# Patient Record
Sex: FEMALE | HISPANIC OR LATINO | URBAN - METROPOLITAN AREA
[De-identification: names, ages, dates, MRNs, and addresses within clinical notes are randomized per-mention and may not be internally consistent; named-entity substitution may affect disease eponyms.]

---

## 2017-11-02 ENCOUNTER — DOCTOR'S OFFICE (OUTPATIENT)
Dept: URBAN - METROPOLITAN AREA CLINIC 125 | Facility: CLINIC | Age: 32
Setting detail: OPHTHALMOLOGY
End: 2017-11-02
Payer: COMMERCIAL

## 2017-11-02 DIAGNOSIS — H40.003: ICD-10-CM

## 2017-11-02 DIAGNOSIS — E11.3293: ICD-10-CM

## 2017-11-02 PROCEDURE — 92133 CPTRZD OPH DX IMG PST SGM ON: CPT | Performed by: OPTOMETRIST

## 2017-11-02 PROCEDURE — 92014 COMPRE OPH EXAM EST PT 1/>: CPT | Performed by: OPTOMETRIST

## 2017-11-02 ASSESSMENT — REFRACTION_MANIFEST
OS_VA2: 20/
OD_VA3: 20/
OD_VA3: 20/
OD_VA1: 20/
OS_VA1: 20/
OS_VA1: 20/
OD_VA2: 20/
OD_VA2: 20/
OS_VA2: 20/
OD_VA1: 20/
OU_VA: 20/
OS_VA3: 20/
OS_VA3: 20/
OU_VA: 20/

## 2017-11-02 ASSESSMENT — REFRACTION_OUTSIDERX
OD_CYLINDER: -2.00
OS_AXIS: 180
OD_VA1: 20/25
OU_VA: 20/
OD_VA3: 20/
OD_VA2: 20/25
OD_AXIS: 009
OS_SPHERE: -2.50
OD_SPHERE: -2.25
OS_VA2: 20/25
OS_VA3: 20/
OS_CYLINDER: -2.00
OS_VA1: 20/25

## 2017-11-02 ASSESSMENT — REFRACTION_CURRENTRX
OD_SPHERE: -2.25
OD_OVR_VA: 20/
OS_SPHERE: -2.75
OS_OVR_VA: 20/
OS_AXIS: 177
OD_OVR_VA: 20/
OD_OVR_VA: 20/
OS_OVR_VA: 20/
OD_VPRISM_DIRECTION: SV
OD_AXIS: 27
OS_CYLINDER: -2.00
OD_CYLINDER: -0.75
OS_OVR_VA: 20/
OS_VPRISM_DIRECTION: SV

## 2017-11-02 ASSESSMENT — REFRACTION_AUTOREFRACTION
OD_CYLINDER: -1.25
OD_AXIS: 008
OS_SPHERE: -2.50
OS_AXIS: 165
OD_SPHERE: -2.50
OS_CYLINDER: -1.25

## 2017-11-02 ASSESSMENT — KERATOMETRY
OS_K1POWER_DIOPTERS: 43.00
OD_AXISANGLE_DEGREES: 009
OS_K2POWER_DIOPTERS: 44.50
OD_K1POWER_DIOPTERS: 43.00
OD_K2POWER_DIOPTERS: 45.00
OS_AXISANGLE_DEGREES: 175

## 2017-11-02 ASSESSMENT — CONFRONTATIONAL VISUAL FIELD TEST (CVF)
OS_FINDINGS: FULL
OD_FINDINGS: FULL

## 2017-11-02 ASSESSMENT — SPHEQUIV_DERIVED
OD_SPHEQUIV: -3.125
OS_SPHEQUIV: -3.125

## 2017-11-02 ASSESSMENT — VASCULARIZATION
OS_VASCULARIZATION: PANNUS
OD_VASCULARIZATION: PANNUS

## 2017-11-02 ASSESSMENT — AXIALLENGTH_DERIVED
OD_AL: 24.6731
OS_AL: 24.774

## 2017-11-02 ASSESSMENT — VISUAL ACUITY
OS_BCVA: 20/25
OD_BCVA: 20/20-2

## 2017-11-09 ENCOUNTER — DOCTOR'S OFFICE (OUTPATIENT)
Dept: URBAN - METROPOLITAN AREA CLINIC 125 | Facility: CLINIC | Age: 32
Setting detail: OPHTHALMOLOGY
End: 2017-11-09
Payer: COMMERCIAL

## 2017-11-09 DIAGNOSIS — H52.223: ICD-10-CM

## 2017-11-09 DIAGNOSIS — H52.13: ICD-10-CM

## 2017-11-09 PROCEDURE — 92310 CONTACT LENS FITTING OU: CPT | Performed by: OPTOMETRIST

## 2017-11-09 PROCEDURE — 92015 DETERMINE REFRACTIVE STATE: CPT | Performed by: OPTOMETRIST

## 2017-11-09 ASSESSMENT — REFRACTION_MANIFEST
OD_VA3: 20/
OS_VA1: 20/
OD_VA2: 20/
OS_VA2: 20/
OS_VA2: 20/
OU_VA: 20/
OD_VA2: 20/
OD_VA1: 20/
OS_VA3: 20/
OS_VA3: 20/
OD_VA3: 20/
OS_VA1: 20/
OU_VA: 20/
OD_VA1: 20/

## 2017-11-09 ASSESSMENT — LID EXAM ASSESSMENTS
OS_MEIBOMITIS: LLL LUL T
OD_MEIBOMITIS: RLL RUL T

## 2017-11-09 ASSESSMENT — AXIALLENGTH_DERIVED
OD_AL: 24.6731
OS_AL: 24.774

## 2017-11-09 ASSESSMENT — VASCULARIZATION
OS_VASCULARIZATION: PANNUS
OD_VASCULARIZATION: PANNUS

## 2017-11-09 ASSESSMENT — REFRACTION_CURRENTRX
OS_OVR_VA: 20/
OD_CYLINDER: -2.00
OD_OVR_VA: 20/
OD_OVR_VA: 20/
OD_SPHERE: -2.25
OD_VPRISM_DIRECTION: SV
OS_CYLINDER: -1.50
OD_OVR_VA: 20/
OS_VPRISM_DIRECTION: SV
OD_AXIS: 008
OS_SPHERE: -2.75
OS_AXIS: 180
OS_OVR_VA: 20/
OS_OVR_VA: 20/

## 2017-11-09 ASSESSMENT — REFRACTION_OUTSIDERX
OD_VA3: 20/
OD_CYLINDER: -2.00
OU_VA: 20/
OD_SPHERE: -2.50
OS_AXIS: 180
OS_VA1: 20/20-1
OD_AXIS: 010
OD_VA2: 20/25
OS_SPHERE: -2.75
OS_CYLINDER: -2.00
OS_VA3: 20/
OD_VA1: 20/20
OS_VA2: 20/25

## 2017-11-09 ASSESSMENT — KERATOMETRY
OD_K2POWER_DIOPTERS: 45.00
OS_AXISANGLE_DEGREES: 175
OD_K1POWER_DIOPTERS: 43.00
OS_K1POWER_DIOPTERS: 43.00
OD_AXISANGLE_DEGREES: 009
OS_K2POWER_DIOPTERS: 44.50

## 2017-11-09 ASSESSMENT — REFRACTION_AUTOREFRACTION
OS_AXIS: 165
OS_CYLINDER: -1.25
OS_SPHERE: -2.50
OD_SPHERE: -2.50
OD_CYLINDER: -1.25
OD_AXIS: 008

## 2017-11-09 ASSESSMENT — VISUAL ACUITY
OS_BCVA: 20/20
OD_BCVA: 20/25+2

## 2017-11-09 ASSESSMENT — SPHEQUIV_DERIVED
OD_SPHEQUIV: -3.125
OS_SPHEQUIV: -3.125

## 2018-05-10 ENCOUNTER — DOCTOR'S OFFICE (OUTPATIENT)
Dept: URBAN - METROPOLITAN AREA CLINIC 125 | Facility: CLINIC | Age: 33
Setting detail: OPHTHALMOLOGY
End: 2018-05-10
Payer: COMMERCIAL

## 2018-05-10 DIAGNOSIS — H40.003: ICD-10-CM

## 2018-05-10 PROCEDURE — 92083 EXTENDED VISUAL FIELD XM: CPT | Performed by: OPTOMETRIST

## 2018-08-02 ENCOUNTER — DOCTOR'S OFFICE (OUTPATIENT)
Dept: URBAN - METROPOLITAN AREA CLINIC 125 | Facility: CLINIC | Age: 33
Setting detail: OPHTHALMOLOGY
End: 2018-08-02
Payer: COMMERCIAL

## 2018-08-02 DIAGNOSIS — E11.9: ICD-10-CM

## 2018-08-02 DIAGNOSIS — H40.013: ICD-10-CM

## 2018-08-02 PROCEDURE — 76514 ECHO EXAM OF EYE THICKNESS: CPT | Performed by: OPHTHALMOLOGY

## 2018-08-02 PROCEDURE — 92012 INTRM OPH EXAM EST PATIENT: CPT | Performed by: OPHTHALMOLOGY

## 2018-08-02 ASSESSMENT — REFRACTION_CURRENTRX
OS_VPRISM_DIRECTION: SV
OD_OVR_VA: 20/
OD_OVR_VA: 20/
OS_CYLINDER: -1.50
OD_OVR_VA: 20/
OS_OVR_VA: 20/
OS_SPHERE: -2.75
OS_OVR_VA: 20/
OD_VPRISM_DIRECTION: SV
OD_CYLINDER: -2.00
OS_AXIS: 180
OS_OVR_VA: 20/
OD_SPHERE: -2.25
OD_AXIS: 008

## 2018-08-02 ASSESSMENT — REFRACTION_AUTOREFRACTION
OS_AXIS: 165
OD_CYLINDER: -1.25
OD_SPHERE: -2.50
OS_SPHERE: -2.50
OD_AXIS: 008
OS_CYLINDER: -1.25

## 2018-08-02 ASSESSMENT — REFRACTION_OUTSIDERX
OD_VA1: 20/20
OD_CYLINDER: -2.00
OS_VA3: 20/
OD_AXIS: 010
OS_VA2: 20/25
OS_CYLINDER: -2.00
OU_VA: 20/
OD_VA2: 20/25
OD_SPHERE: -2.50
OD_VA3: 20/
OS_VA1: 20/20-1
OS_SPHERE: -2.75
OS_AXIS: 180

## 2018-08-02 ASSESSMENT — REFRACTION_MANIFEST
OS_VA1: 20/
OS_VA2: 20/
OD_VA3: 20/
OD_VA1: 20/
OS_VA2: 20/
OD_VA3: 20/
OS_VA1: 20/
OD_VA1: 20/
OS_VA3: 20/
OU_VA: 20/
OS_VA3: 20/
OD_VA2: 20/
OD_VA2: 20/
OU_VA: 20/

## 2018-08-02 ASSESSMENT — VASCULARIZATION
OS_VASCULARIZATION: PANNUS
OD_VASCULARIZATION: PANNUS

## 2018-08-02 ASSESSMENT — PACHYMETRY
OD_CT_UM: 591
OS_CT_UM: 597
OD_CT_CORRECTION: -4
OS_CT_CORRECTION: -4

## 2018-08-02 ASSESSMENT — VISUAL ACUITY
OD_BCVA: 20/25-2
OS_BCVA: 20/20

## 2018-08-02 ASSESSMENT — SPHEQUIV_DERIVED
OS_SPHEQUIV: -3.125
OD_SPHEQUIV: -3.125

## 2018-08-02 ASSESSMENT — CONFRONTATIONAL VISUAL FIELD TEST (CVF)
OD_FINDINGS: FULL
OS_FINDINGS: FULL

## 2019-01-17 ENCOUNTER — DOCTOR'S OFFICE (OUTPATIENT)
Dept: URBAN - METROPOLITAN AREA CLINIC 125 | Facility: CLINIC | Age: 34
Setting detail: OPHTHALMOLOGY
End: 2019-01-17
Payer: COMMERCIAL

## 2019-01-17 DIAGNOSIS — H25.013: ICD-10-CM

## 2019-01-17 DIAGNOSIS — E11.9: ICD-10-CM

## 2019-01-17 PROCEDURE — 92014 COMPRE OPH EXAM EST PT 1/>: CPT | Performed by: OPHTHALMOLOGY

## 2019-01-17 ASSESSMENT — REFRACTION_MANIFEST
OU_VA: 20/
OD_SPHERE: -2.50
OS_VA2: 20/
OD_AXIS: 010
OS_CYLINDER: -2.00
OD_VA1: 20/
OU_VA: 20/
OS_VA1: 20/20-1
OS_VA2: 20/25
OD_VA2: 20/25
OD_VA2: 20/
OS_VA3: 20/
OD_VA1: 20/20
OS_SPHERE: -2.75
OD_VA3: 20/
OS_VA3: 20/
OD_CYLINDER: -2.00
OS_AXIS: 180
OD_VA3: 20/
OS_VA1: 20/

## 2019-01-17 ASSESSMENT — REFRACTION_AUTOREFRACTION
OD_CYLINDER: -1.25
OS_AXIS: 165
OD_AXIS: 008
OS_SPHERE: -2.50
OD_SPHERE: -2.50
OS_CYLINDER: -1.25

## 2019-01-17 ASSESSMENT — CONFRONTATIONAL VISUAL FIELD TEST (CVF)
OD_FINDINGS: FULL
OS_FINDINGS: FULL

## 2019-01-17 ASSESSMENT — SPHEQUIV_DERIVED
OS_SPHEQUIV: -3.125
OD_SPHEQUIV: -3.5
OD_SPHEQUIV: -3.125
OS_SPHEQUIV: -3.75

## 2019-01-17 ASSESSMENT — REFRACTION_CURRENTRX
OD_CYLINDER: -2.00
OD_SPHERE: -2.25
OS_SPHERE: -2.75
OS_VPRISM_DIRECTION: SV
OD_AXIS: 008
OS_OVR_VA: 20/
OS_CYLINDER: -1.50
OD_OVR_VA: 20/
OS_AXIS: 180
OD_OVR_VA: 20/
OD_OVR_VA: 20/
OS_OVR_VA: 20/
OD_VPRISM_DIRECTION: SV
OS_OVR_VA: 20/

## 2019-01-17 ASSESSMENT — VASCULARIZATION
OD_VASCULARIZATION: PANNUS
OS_VASCULARIZATION: PANNUS

## 2019-01-17 ASSESSMENT — VISUAL ACUITY
OS_BCVA: 20/20-1
OD_BCVA: 20/20-1

## 2019-01-28 ENCOUNTER — OPTICAL OFFICE (OUTPATIENT)
Dept: URBAN - METROPOLITAN AREA CLINIC 134 | Facility: CLINIC | Age: 34
Setting detail: OPHTHALMOLOGY
End: 2019-01-28
Payer: COMMERCIAL

## 2019-01-28 ENCOUNTER — DOCTOR'S OFFICE (OUTPATIENT)
Dept: URBAN - METROPOLITAN AREA CLINIC 125 | Facility: CLINIC | Age: 34
Setting detail: OPHTHALMOLOGY
End: 2019-01-28
Payer: COMMERCIAL

## 2019-01-28 DIAGNOSIS — H52.223: ICD-10-CM

## 2019-01-28 DIAGNOSIS — H52.13: ICD-10-CM

## 2019-01-28 PROCEDURE — V2103 SPHEROCYLINDR 4.00D/12-2.00D: HCPCS | Performed by: OPTOMETRIST

## 2019-01-28 PROCEDURE — V2020 VISION SVCS FRAMES PURCHASES: HCPCS | Performed by: OPTOMETRIST

## 2019-01-28 PROCEDURE — V2025 EYEGLASSES DELUX FRAMES: HCPCS | Performed by: OPTOMETRIST

## 2019-01-28 PROCEDURE — 92310 CONTACT LENS FITTING OU: CPT | Performed by: OPTOMETRIST

## 2019-01-28 PROCEDURE — 92015 DETERMINE REFRACTIVE STATE: CPT | Performed by: OPTOMETRIST

## 2019-01-28 ASSESSMENT — AXIALLENGTH_DERIVED
OD_AL: 24.5671
OS_AL: 24.9362
OS_AL: 24.774
OD_AL: 24.7265

## 2019-01-28 ASSESSMENT — REFRACTION_MANIFEST
OD_AXIS: 010
OD_VA2: 20/
OS_CYLINDER: -1.50
OD_VA3: 20/
OS_VA3: 20/
OD_VA3: 20/
OU_VA: 20/
OS_VA2: 20/
OD_VA1: 20/20
OD_CYLINDER: -1.50
OD_SPHERE: -2.50
OU_VA: 20/
OD_VA2: 20/25
OS_VA1: 20/20
OS_VA1: 20/
OS_AXIS: 180
OS_VA3: 20/
OS_SPHERE: -2.75
OS_VA2: 20/25
OD_VA1: 20/

## 2019-01-28 ASSESSMENT — KERATOMETRY
OD_K1POWER_DIOPTERS: 43.00
OD_AXISANGLE_DEGREES: 002
OD_K2POWER_DIOPTERS: 45.00
OS_K2POWER_DIOPTERS: 44.75
OS_K1POWER_DIOPTERS: 42.75
OS_AXISANGLE_DEGREES: 002

## 2019-01-28 ASSESSMENT — REFRACTION_AUTOREFRACTION
OD_AXIS: 005
OS_CYLINDER: -1.75
OD_CYLINDER: -1.25
OS_SPHERE: -2.25
OD_SPHERE: -2.25
OS_AXIS: 170

## 2019-01-28 ASSESSMENT — REFRACTION_CURRENTRX
OD_SPHERE: -2.25
OS_CYLINDER: -1.50
OD_OVR_VA: 20/
OD_OVR_VA: 20/
OD_AXIS: 008
OD_OVR_VA: 20/
OD_VPRISM_DIRECTION: SV
OS_OVR_VA: 20/
OS_OVR_VA: 20/
OS_SPHERE: -2.75
OS_OVR_VA: 20/
OS_VPRISM_DIRECTION: SV
OD_CYLINDER: -2.00
OS_AXIS: 180

## 2019-01-28 ASSESSMENT — SPHEQUIV_DERIVED
OS_SPHEQUIV: -3.5
OD_SPHEQUIV: -2.875
OD_SPHEQUIV: -3.25
OS_SPHEQUIV: -3.125

## 2019-01-28 ASSESSMENT — VISUAL ACUITY
OD_BCVA: 20/25-2
OS_BCVA: 20/20-2

## 2020-01-23 ENCOUNTER — DOCTOR'S OFFICE (OUTPATIENT)
Dept: URBAN - METROPOLITAN AREA CLINIC 125 | Facility: CLINIC | Age: 35
Setting detail: OPHTHALMOLOGY
End: 2020-01-23
Payer: COMMERCIAL

## 2020-01-23 VITALS — HEIGHT: 55 IN

## 2020-01-23 DIAGNOSIS — H40.013: ICD-10-CM

## 2020-01-23 DIAGNOSIS — E11.9: ICD-10-CM

## 2020-01-23 DIAGNOSIS — H25.013: ICD-10-CM

## 2020-01-23 PROCEDURE — 92014 COMPRE OPH EXAM EST PT 1/>: CPT | Performed by: OPHTHALMOLOGY

## 2020-01-23 PROCEDURE — 92133 CPTRZD OPH DX IMG PST SGM ON: CPT | Performed by: OPHTHALMOLOGY

## 2020-01-23 PROCEDURE — 76514 ECHO EXAM OF EYE THICKNESS: CPT | Performed by: OPHTHALMOLOGY

## 2020-01-23 ASSESSMENT — REFRACTION_MANIFEST
OD_VA3: 20/
OS_AXIS: 180
OD_AXIS: 010
OD_CYLINDER: -1.50
OS_VA3: 20/
OS_VA1: 20/20
OS_SPHERE: -2.75
OU_VA: 20/
OD_VA2: 20/25
OS_VA2: 20/25
OS_CYLINDER: -1.50
OD_VA1: 20/20
OD_SPHERE: -2.50

## 2020-01-23 ASSESSMENT — PACHYMETRY
OS_CT_CORRECTION: -4
OS_CT_UM: 596
OD_CT_CORRECTION: -4
OD_CT_UM: 596

## 2020-01-23 ASSESSMENT — REFRACTION_AUTOREFRACTION
OD_CYLINDER: -1.25
OS_AXIS: 170
OS_CYLINDER: -1.75
OD_SPHERE: -2.25
OD_AXIS: 005
OS_SPHERE: -2.25

## 2020-01-23 ASSESSMENT — AXIALLENGTH_DERIVED
OS_AL: 24.9362
OD_AL: 24.7265
OD_AL: 24.5671
OS_AL: 24.774

## 2020-01-23 ASSESSMENT — KERATOMETRY
OD_AXISANGLE_DEGREES: 002
OS_AXISANGLE_DEGREES: 002
OD_K1POWER_DIOPTERS: 43.00
OD_K2POWER_DIOPTERS: 45.00
OS_K1POWER_DIOPTERS: 42.75
OS_K2POWER_DIOPTERS: 44.75

## 2020-01-23 ASSESSMENT — VISUAL ACUITY
OS_BCVA: 20/20-
OD_BCVA: 20/20-

## 2020-01-23 ASSESSMENT — VASCULARIZATION
OD_VASCULARIZATION: PANNUS
OS_VASCULARIZATION: PANNUS

## 2020-01-23 ASSESSMENT — REFRACTION_CURRENTRX
OS_SPHERE: -2.75
OS_OVR_VA: 20/
OD_CYLINDER: -1.50
OD_VPRISM_DIRECTION: SV
OD_SPHERE: -2.50
OD_OVR_VA: 20/
OS_AXIS: 180
OS_VPRISM_DIRECTION: SV
OD_AXIS: 010
OS_CYLINDER: -1.50

## 2020-01-23 ASSESSMENT — SPHEQUIV_DERIVED
OD_SPHEQUIV: -2.875
OS_SPHEQUIV: -3.5
OS_SPHEQUIV: -3.125
OD_SPHEQUIV: -3.25

## 2020-01-23 ASSESSMENT — CONFRONTATIONAL VISUAL FIELD TEST (CVF)
OD_FINDINGS: FULL
OS_FINDINGS: FULL

## 2020-01-23 ASSESSMENT — SUPERFICIAL PUNCTATE KERATITIS (SPK)
OD_SPK: T
OS_SPK: 1+

## 2020-02-10 ENCOUNTER — DOCTOR'S OFFICE (OUTPATIENT)
Dept: URBAN - METROPOLITAN AREA CLINIC 125 | Facility: CLINIC | Age: 35
Setting detail: OPHTHALMOLOGY
End: 2020-02-10
Payer: COMMERCIAL

## 2020-02-10 ENCOUNTER — OPTICAL OFFICE (OUTPATIENT)
Dept: URBAN - METROPOLITAN AREA CLINIC 134 | Facility: CLINIC | Age: 35
Setting detail: OPHTHALMOLOGY
End: 2020-02-10
Payer: COMMERCIAL

## 2020-02-10 VITALS — HEIGHT: 55 IN

## 2020-02-10 DIAGNOSIS — H52.223: ICD-10-CM

## 2020-02-10 DIAGNOSIS — H52.13: ICD-10-CM

## 2020-02-10 PROCEDURE — V2020 VISION SVCS FRAMES PURCHASES: HCPCS | Performed by: OPHTHALMOLOGY

## 2020-02-10 PROCEDURE — 92015 DETERMINE REFRACTIVE STATE: CPT | Performed by: OPHTHALMOLOGY

## 2020-02-10 PROCEDURE — V2103 SPHEROCYLINDR 4.00D/12-2.00D: HCPCS | Performed by: OPHTHALMOLOGY

## 2020-02-10 PROCEDURE — 92310 CONTACT LENS FITTING OU: CPT | Performed by: OPHTHALMOLOGY

## 2020-02-10 PROCEDURE — 92014 COMPRE OPH EXAM EST PT 1/>: CPT | Performed by: OPHTHALMOLOGY

## 2020-02-10 ASSESSMENT — REFRACTION_AUTOREFRACTION
OD_CYLINDER: -1.50
OS_AXIS: 174
OS_CYLINDER: -1.75
OD_SPHERE: -1.75
OD_AXIS: 006
OS_SPHERE: -2.00

## 2020-02-10 ASSESSMENT — SUPERFICIAL PUNCTATE KERATITIS (SPK)
OD_SPK: T
OS_SPK: 1+

## 2020-02-10 ASSESSMENT — REFRACTION_CURRENTRX
OS_AXIS: 174
OD_SPHERE: -2.50
OD_OVR_VA: 20/
OS_VPRISM_DIRECTION: SV
OS_CYLINDER: -1.50
OD_CYLINDER: -1.50
OD_AXIS: 006
OS_OVR_VA: 20/
OS_SPHERE: -2.75
OD_VPRISM_DIRECTION: SV

## 2020-02-10 ASSESSMENT — REFRACTION_MANIFEST
OU_VA: 20/
OS_CYLINDER: -1.50
OD_CYLINDER: -1.50
OS_SPHERE: -2.50
OD_SPHERE: -2.50
OD_AXIS: 010
OD_VA3: 20/
OS_VA2: 20/25
OS_VA1: 20/20
OS_AXIS: 180
OS_VA3: 20/
OD_VA1: 20/20
OD_VA2: 20/25

## 2020-02-10 ASSESSMENT — KERATOMETRY
OD_AXISANGLE_DEGREES: 004
OS_AXISANGLE_DEGREES: 005
OS_K2POWER_DIOPTERS: 44.50
OD_K2POWER_DIOPTERS: 45.00
OS_K1POWER_DIOPTERS: 42.75
OD_K1POWER_DIOPTERS: 43.00

## 2020-02-10 ASSESSMENT — SPHEQUIV_DERIVED
OS_SPHEQUIV: -2.875
OS_SPHEQUIV: -3.25
OD_SPHEQUIV: -3.25
OD_SPHEQUIV: -2.5

## 2020-02-10 ASSESSMENT — VASCULARIZATION
OS_VASCULARIZATION: PANNUS
OD_VASCULARIZATION: PANNUS

## 2020-02-10 ASSESSMENT — AXIALLENGTH_DERIVED
OD_AL: 24.7265
OS_AL: 24.8788
OS_AL: 24.7173
OD_AL: 24.4097

## 2020-02-10 ASSESSMENT — VISUAL ACUITY
OS_BCVA: 20/20
OD_BCVA: 20/20-1

## 2020-02-10 ASSESSMENT — CONFRONTATIONAL VISUAL FIELD TEST (CVF)
OD_FINDINGS: FULL
OS_FINDINGS: FULL

## 2022-05-04 ENCOUNTER — DOCTOR'S OFFICE (OUTPATIENT)
Dept: URBAN - NONMETROPOLITAN AREA CLINIC 1 | Facility: CLINIC | Age: 37
Setting detail: OPHTHALMOLOGY
End: 2022-05-04
Payer: COMMERCIAL

## 2022-05-04 ENCOUNTER — OPTICAL OFFICE (OUTPATIENT)
Dept: URBAN - NONMETROPOLITAN AREA CLINIC 4 | Facility: CLINIC | Age: 37
Setting detail: OPHTHALMOLOGY
End: 2022-05-04
Payer: COMMERCIAL

## 2022-05-04 DIAGNOSIS — E11.3293: ICD-10-CM

## 2022-05-04 DIAGNOSIS — H52.13: ICD-10-CM

## 2022-05-04 DIAGNOSIS — H52.223: ICD-10-CM

## 2022-05-04 PROBLEM — H25.013: Status: ACTIVE | Noted: 2019-01-17

## 2022-05-04 PROBLEM — H40.011: Status: ACTIVE | Noted: 2020-01-23

## 2022-05-04 PROBLEM — H40.012: Status: ACTIVE | Noted: 2020-01-23

## 2022-05-04 PROCEDURE — 92014 COMPRE OPH EXAM EST PT 1/>: CPT | Performed by: OPTOMETRIST

## 2022-05-04 PROCEDURE — 92310 CONTACT LENS FITTING OU: CPT | Performed by: OPTOMETRIST

## 2022-05-04 PROCEDURE — 92134 CPTRZ OPH DX IMG PST SGM RTA: CPT | Performed by: OPTOMETRIST

## 2022-05-04 PROCEDURE — V2020 VISION SVCS FRAMES PURCHASES: HCPCS | Performed by: OPTOMETRIST

## 2022-05-04 PROCEDURE — V2103 SPHEROCYLINDR 4.00D/12-2.00D: HCPCS | Performed by: OPTOMETRIST

## 2022-05-04 ASSESSMENT — REFRACTION_CURRENTRX
OD_SPHERE: -2.50
OD_AXIS: 006
OS_SPHERE: -2.75
OS_VPRISM_DIRECTION: SV
OD_CYLINDER: -1.50
OD_VPRISM_DIRECTION: SV
OS_CYLINDER: -1.50
OS_OVR_VA: 20/
OS_AXIS: 174
OD_OVR_VA: 20/

## 2022-05-04 ASSESSMENT — SPHEQUIV_DERIVED
OS_SPHEQUIV: -3.5
OD_SPHEQUIV: -3.375
OD_SPHEQUIV: -3.25
OS_SPHEQUIV: -3.375

## 2022-05-04 ASSESSMENT — AXIALLENGTH_DERIVED
OS_AL: 24.9876
OD_AL: 24.7801
OS_AL: 24.933
OD_AL: 24.7265

## 2022-05-04 ASSESSMENT — REFRACTION_MANIFEST
OD_AXIS: 010
OS_VA1: 20/20
OS_VA2: 20/25
OD_VA1: 20/20
OD_SPHERE: -2.50
OS_AXIS: 180
OD_CYLINDER: -1.75
OS_SPHERE: -2.50
OD_VA2: 20/25
OS_CYLINDER: -2.00

## 2022-05-04 ASSESSMENT — PACHYMETRY
OD_CT_CORRECTION: -4
OS_CT_UM: 597
OS_CT_CORRECTION: -4
OD_CT_UM: 591

## 2022-05-04 ASSESSMENT — CONFRONTATIONAL VISUAL FIELD TEST (CVF)
OS_FINDINGS: FULL
OD_FINDINGS: FULL

## 2022-05-04 ASSESSMENT — KERATOMETRY
OS_K1POWER_DIOPTERS: 42.75
OD_K1POWER_DIOPTERS: 43.00
OS_AXISANGLE_DEGREES: 005
OS_K2POWER_DIOPTERS: 44.50
OD_AXISANGLE_DEGREES: 004
OD_K2POWER_DIOPTERS: 45.00

## 2022-05-04 ASSESSMENT — REFRACTION_AUTOREFRACTION
OD_CYLINDER: -2.00
OS_AXIS: 180
OD_SPHERE: -2.25
OD_AXIS: 012
OS_SPHERE: -2.25
OS_CYLINDER: -2.25

## 2022-05-04 ASSESSMENT — VISUAL ACUITY
OS_BCVA: 20/20-2
OD_BCVA: 20/25-2

## 2022-05-04 ASSESSMENT — VASCULARIZATION
OS_VASCULARIZATION: PANNUS
OD_VASCULARIZATION: PANNUS

## 2022-05-04 ASSESSMENT — SUPERFICIAL PUNCTATE KERATITIS (SPK)
OD_SPK: T
OS_SPK: 1+

## 2022-05-04 ASSESSMENT — TONOMETRY
OS_IOP_MMHG: 15
OD_IOP_MMHG: 15

## 2022-09-20 LAB
ABO GROUP BLD: NORMAL
ALBUMIN SERPL-MCNC: 4.8 G/DL (ref 3.6–5.1)
ALBUMIN/GLOB SERPL: 1.5 (CALC) (ref 1–2.5)
ALP SERPL-CCNC: 49 U/L (ref 31–125)
ALT SERPL-CCNC: 76 U/L (ref 6–29)
APPEARANCE UR: CLEAR
APTT PPP: 27 SEC (ref 23–32)
AST SERPL-CCNC: 40 U/L (ref 10–30)
BASOPHILS # BLD AUTO: 80 CELLS/UL (ref 0–200)
BASOPHILS NFR BLD AUTO: 1.2 %
BILIRUB SERPL-MCNC: 0.5 MG/DL (ref 0.2–1.2)
BILIRUB UR QL STRIP: NEGATIVE
BLD GP AB SCN SERPL QL: NORMAL
BUN SERPL-MCNC: 9 MG/DL (ref 7–25)
BUN/CREAT SERPL: 8 (CALC) (ref 6–22)
CALCIUM SERPL-MCNC: 9.9 MG/DL (ref 8.6–10.2)
CHLORIDE SERPL-SCNC: 103 MMOL/L (ref 98–110)
CO2 SERPL-SCNC: 21 MMOL/L (ref 20–32)
COLOR UR: YELLOW
CREAT SERPL-MCNC: 1.13 MG/DL (ref 0.5–0.97)
EOSINOPHIL # BLD AUTO: 127 CELLS/UL (ref 15–500)
EOSINOPHIL NFR BLD AUTO: 1.9 %
ERYTHROCYTE [DISTWIDTH] IN BLOOD BY AUTOMATED COUNT: 12.5 % (ref 11–15)
GFR/BSA.PRED SERPLBLD CYS-BASED-ARV: 65 ML/MIN/1.73M2
GLOBULIN SER CALC-MCNC: 3.2 G/DL (CALC) (ref 1.9–3.7)
GLUCOSE SERPL-MCNC: 80 MG/DL (ref 65–99)
GLUCOSE UR QL STRIP: NEGATIVE
HCT VFR BLD AUTO: 41.8 % (ref 35–45)
HGB BLD-MCNC: 14.3 G/DL (ref 11.7–15.5)
HGB UR QL STRIP: NEGATIVE
INR PPP: 1
KETONES UR QL STRIP: NEGATIVE
LEUKOCYTE ESTERASE UR QL STRIP: NEGATIVE
LYMPHOCYTES # BLD AUTO: 2057 CELLS/UL (ref 850–3900)
LYMPHOCYTES NFR BLD AUTO: 30.7 %
MCH RBC QN AUTO: 29.2 PG (ref 27–33)
MCHC RBC AUTO-ENTMCNC: 34.2 G/DL (ref 32–36)
MCV RBC AUTO: 85.5 FL (ref 80–100)
MONOCYTES # BLD AUTO: 389 CELLS/UL (ref 200–950)
MONOCYTES NFR BLD AUTO: 5.8 %
NEUTROPHILS # BLD AUTO: 4047 CELLS/UL (ref 1500–7800)
NEUTROPHILS NFR BLD AUTO: 60.4 %
NITRITE UR QL STRIP: NEGATIVE
PH UR STRIP: 5.5 [PH] (ref 5–8)
PLATELET # BLD AUTO: 349 THOUSAND/UL (ref 140–400)
PMV BLD REES-ECKER: 10.1 FL (ref 7.5–12.5)
POTASSIUM SERPL-SCNC: 4.4 MMOL/L (ref 3.5–5.3)
PROT SERPL-MCNC: 8 G/DL (ref 6.1–8.1)
PROT UR QL STRIP: NEGATIVE
PROTHROMBIN TIME: 9.9 SEC (ref 9–11.5)
RBC # BLD AUTO: 4.89 MILLION/UL (ref 3.8–5.1)
RH BLD: NORMAL
SODIUM SERPL-SCNC: 136 MMOL/L (ref 135–146)
SP GR UR STRIP: 1.01 (ref 1–1.03)
WBC # BLD AUTO: 6.7 THOUSAND/UL (ref 3.8–10.8)

## 2022-10-14 LAB — DHEA-S SERPL-MCNC: 48 MCG/DL (ref 19–237)

## 2022-10-16 LAB
ALBUMIN SERPL-MCNC: 4.4 G/DL (ref 3.6–5.1)
DHEA-S SERPL-MCNC: 48 MCG/DL (ref 19–237)
SHBG SERPL-SCNC: 132 NMOL/L (ref 17–124)
TESTOST FREE SERPL-MCNC: 0.3 PG/ML (ref 0.2–5)
TESTOST SERPL-MCNC: 9 NG/DL (ref 2–45)
TESTOSTERONE.FREE+WB SERPL-MCNC: 0.6 NG/DL (ref 0.5–8.5)

## 2023-02-22 LAB
CLINICAL INFO: NORMAL
CYTO CVX: NORMAL
CYTOLOGY CMNT CVX/VAG CYTO-IMP: NORMAL
DATE PREVIOUS BX: NORMAL
LMP START DATE: NORMAL
SL AMB PREV. PAP:: NORMAL
SPECIMEN SOURCE CVX/VAG CYTO: NORMAL

## 2023-03-31 RX ORDER — AMOXICILLIN 250 MG
1 CAPSULE ORAL DAILY
COMMUNITY

## 2023-03-31 RX ORDER — LACTULOSE 10 G/15ML
SOLUTION ORAL
COMMUNITY
Start: 2022-10-03

## 2023-03-31 RX ORDER — SPIRONOLACTONE 100 MG/1
100 TABLET, FILM COATED ORAL 2 TIMES DAILY
COMMUNITY
Start: 2022-11-01

## 2023-03-31 RX ORDER — FLUCONAZOLE 150 MG/1
TABLET ORAL
COMMUNITY
Start: 2023-02-28

## 2023-03-31 RX ORDER — LIDOCAINE 50 MG/G
3 PATCH TOPICAL EVERY 24 HOURS
COMMUNITY
Start: 2023-02-07

## 2023-03-31 RX ORDER — PNV,CALCIUM 72/IRON/FOLIC ACID 27 MG-1 MG
1 TABLET ORAL EVERY MORNING
COMMUNITY
Start: 2023-03-14

## 2023-03-31 RX ORDER — POLYETHYLENE GLYCOL 3350 17 G/17G
17 POWDER, FOR SOLUTION ORAL DAILY
COMMUNITY
Start: 2023-02-27

## 2023-03-31 RX ORDER — NORETHINDRONE ACETATE AND ETHINYL ESTRADIOL 1.5-30(21)
1 KIT ORAL DAILY
COMMUNITY
Start: 2023-03-16

## 2023-03-31 RX ORDER — TOPIRAMATE 25 MG/1
25 TABLET ORAL 2 TIMES DAILY
COMMUNITY
Start: 2023-03-29

## 2023-03-31 RX ORDER — CYCLOBENZAPRINE HCL 10 MG
10 TABLET ORAL
COMMUNITY
Start: 2023-03-07

## 2023-03-31 RX ORDER — PHENTERMINE HYDROCHLORIDE 37.5 MG/1
1 TABLET ORAL
COMMUNITY
Start: 2023-03-29

## 2023-03-31 RX ORDER — NYSTATIN 100000 [USP'U]/G
POWDER TOPICAL
COMMUNITY
Start: 2023-02-28

## 2023-03-31 RX ORDER — LEVOTHYROXINE SODIUM 0.03 MG/1
TABLET ORAL
COMMUNITY
Start: 2023-03-06

## 2023-03-31 RX ORDER — GABAPENTIN 600 MG/1
600 TABLET ORAL 3 TIMES DAILY
COMMUNITY
Start: 2023-03-29

## 2023-04-05 ENCOUNTER — CONSULT (OUTPATIENT)
Dept: PAIN MEDICINE | Facility: CLINIC | Age: 38
End: 2023-04-05

## 2023-04-05 VITALS
DIASTOLIC BLOOD PRESSURE: 52 MMHG | HEART RATE: 100 BPM | RESPIRATION RATE: 20 BRPM | SYSTOLIC BLOOD PRESSURE: 98 MMHG | HEIGHT: 66 IN | WEIGHT: 206 LBS | BODY MASS INDEX: 33.11 KG/M2 | TEMPERATURE: 98 F

## 2023-04-05 DIAGNOSIS — M51.26 LUMBAR DISC HERNIATION: ICD-10-CM

## 2023-04-05 DIAGNOSIS — M47.816 LUMBAR SPONDYLOSIS: Primary | ICD-10-CM

## 2023-04-05 RX ORDER — PHENTERMINE HYDROCHLORIDE 37.5 MG/1
37.5 CAPSULE ORAL EVERY MORNING
COMMUNITY
Start: 2023-01-04

## 2023-04-05 RX ORDER — LINACLOTIDE 290 UG/1
CAPSULE, GELATIN COATED ORAL
COMMUNITY
Start: 2023-03-30

## 2023-04-05 RX ORDER — BUPIVACAINE HCL/PF 2.5 MG/ML
10 VIAL (ML) INJECTION ONCE
Status: CANCELLED | OUTPATIENT
Start: 2023-04-05 | End: 2023-04-05

## 2023-04-05 RX ORDER — METHYLPREDNISOLONE ACETATE 80 MG/ML
80 INJECTION, SUSPENSION INTRA-ARTICULAR; INTRALESIONAL; INTRAMUSCULAR; PARENTERAL; SOFT TISSUE ONCE
Status: CANCELLED | OUTPATIENT
Start: 2023-04-05 | End: 2023-04-05

## 2023-04-05 RX ORDER — AMITRIPTYLINE HYDROCHLORIDE 10 MG/1
10 TABLET, FILM COATED ORAL
COMMUNITY
Start: 2023-01-09

## 2023-04-05 RX ORDER — LIDOCAINE HYDROCHLORIDE 10 MG/ML
10 INJECTION, SOLUTION EPIDURAL; INFILTRATION; INTRACAUDAL; PERINEURAL ONCE
Status: CANCELLED | OUTPATIENT
Start: 2023-04-05 | End: 2023-04-05

## 2023-04-05 NOTE — DISCHARGE INSTR - AVS FIRST PAGE
YOUR 2 WEEK FOLLOW UP HAS BEEN SCHEDULED; IF YOU WISH TO CHANGE THE FOLLOW UP, PLEASE CALL THE [facility] AT [place]: [phone]    MEDIAL BRANCH BLOCK DISCHARGE INSTRUCTIONS  ACTIVITY  Please do activities that will bring the normal pain that we are rating  For example, if vacuuming or walking increases the painm do that  Sebastian twill give the most accurate response to the diary  You may shower, but no tub baths today, or applied heat  CARE OF THE INJECTION SITE  This area may be numb for several hours after the injection  Notify the [facility] if you have any of the following: redness, drainage, swelling or fever above 100°F     SPECIAL INSTRUCTIONS  Please return the MBB diary to our office by mail, fax, or drop it off  MEDICATIONS  Please do not take any break through or short acting pain medications for 8 hours after the block  Continue to take all routine medications  Our office may have instructed you to hold some medications  You may resume _______________________________________________  If you have any problems specifically related to your procedure, please call our office at [phone]  Problems not related to your procedure should be directed at your primary care physician  Lumbar Radiofrequency Ablation   WHAT YOU NEED TO KNOW:   What do I need to know about lumbar radiofrequency ablation? Lumbar radiofrequency ablation (RFA) is a procedure used to treat facet joint pain in your lower back  Facet joints are found at the back of each vertebra  A needle electrode is used to send electrical currents to the nerves in your facet joint  The electrical currents create heat that damages the nerve so it cannot send pain signals  How do I prepare for lumbar RFA? Your healthcare provider will talk to you about how to prepare for this procedure  You may be told to not to eat or drink anything after midnight on the day of your procedure   Your provider will tell you what medicines to take or not take on the day of your procedure  What will happen during lumbar RFA? You will lie on your stomach  You will be given local anesthesia to numb the area of your back where the needle electrode will be inserted  You may be given a sedative to help keep you relaxed  You may still feel pressure or pushing during the procedure, but you should not feel any pain  Your healthcare provider will use fluoroscopy (a type of x-ray) to guide the needle electrode to the nerves near your facet joint  Your healthcare provider may touch the affected nerve to make sure the needle electrode is in the right place  You will feel tingling or pressure when your provider does this  Your provider will then apply local anesthesia to the nerve to numb it  This will prevent you from feeling pain when your provider applies heat to the nerve  Your provider will then apply heat to the nerve using the needle electrode  Your provider may need to apply heat to more than one nerve  Your provider will remove the needle electrode and apply a bandage over the area  What are the risks of lumbar RFA? You may have pain, numbness, tingling, or burning in the area where the lumbar RFA was done  These normally go away within 6 weeks  The needle electrode may injure your spinal nerves  This may cause permanent leg weakness or nerve pain  CARE AGREEMENT:   You have the right to help plan your care  Learn about your health condition and how it may be treated  Discuss treatment options with your healthcare providers to decide what care you want to receive  You always have the right to refuse treatment  The above information is an  only  It is not intended as medical advice for individual conditions or treatments  Talk to your doctor, nurse or pharmacist before following any medical regimen to see if it is safe and effective for you    © Copyright [employer] 2022 Information is for End User's use only and may not be sold, redistributed or otherwise used for commercial purposes

## 2023-04-05 NOTE — H&P (VIEW-ONLY)
Assessment  1  Lumbar spondylosis    2  Lumbar disc herniation    Axial low back pain described primarily by arthritic features  Aching, nagging, indolent, stabbing, throbbing features in axial low back without radicular components  5/5 strength bilaterally, negative SLR  Positive facet loading maneuvers in lumbar spine elicited pain, positive tenderness to palpation over lumbar paraspinal muscles  Findings correlate with prominent lumbar facet arthropathy seen throughout axial low back on MRI without significant nerve root compression; (mild disc bulge L4-L5)  Currently she is neurologically intact without gait instability, saddle anesthesia or bowel/bladder abnormality  Risks, benefits alternative to medial branch blocks and subsequent radiofrequency ablation if successful thoroughly discussed with patient  Handouts provided questions answered to patient satisfaction  Plan  -Bilateral L3, L4, L5 medial branch blocks #1; f/u 2 weeks post procedure  -gabapentin 600 mg t i d  previously ordered for patient; counseled regarding sedative effects of taking this medication and provided up titration calendar  Counseled not to take medication while driving or operating heavy machinery/using stairs  -Voltaren gel prn pain rx prn pain prescribed  Patient educated regarding bleeding risk of taking this medication as well as not taking any other nonsteroidal anti-inflammatory medications while taking this medication; counseled thoroughly regarding potential risk of Cardiovascular injury, Kidney injury, Gastrointestinal ulceration/bleeding  Patient voiced understanding  -script provided for formal physical therapy for lumbar spondylosis; Physician directed home exercise plan as per [facility] demonstrated and handouts provided that patient plans to participate with for 1 hour, twice a week for the next 6 weeks  There are risks associated with opioid medications, including dependence, addiction and tolerance   The patient understands and agrees to use these medications only as prescribed  Potential side effects of the medications include, but are not limited to, constipation, drowsiness, addiction, impaired judgment and risk of fatal overdose if not taken as prescribed  The patient was warned against driving while taking sedation medications or operating heavy machinery  The patient voiced understanding  Sharing medications is a felony  At this point in time, the patient is showing no signs of addiction, abuse, diversion or suicidal ideation  [place] Prescription Drug Monitoring Program report was reviewed and was appropriate      Complete risks and benefits including bleeding, infection, tissue reaction, nerve injury and allergic reaction were discussed  The approach was demonstrated using models and literature was provided  Verbal and written consent was obtained  My impressions and treatment recommendations were discussed in detail with the patient who verbalized understanding and had no further questions  Discharge instructions were provided  I personally saw and examined the patient and I agree with the above discussed plan of care  New Medications Ordered This Visit   Medications   • CYANOCOBALAMIN PO     Sig: Take 1 tablet by mouth daily   • Prenatal Vit-Iron Carbonyl-FA (prenatal multivitamin) TABS     Sig: Take 1 tablet by mouth daily   • cyclobenzaprine (FLEXERIL) 10 mg tablet     Sig: Take 10 mg by mouth   • Diclofenac Sodium (VOLTAREN) 1 %     Sig: Apply 2-4 grams topically two to four times daily  Apply to both knees     • fluconazole (DIFLUCAN) 150 mg tablet     Sig: take 1 tablet by mouth AS A ONE TIME DOSE   • gabapentin (NEURONTIN) 600 MG tablet     Sig: Take 600 mg by mouth Three times a day   • lactulose (CHRONULAC) 10 g/15 mL solution     Sig: take 30 milliliters by mouth once daily if needed   • levothyroxine 25 mcg tablet     Sig: take 1/2 tablet by mouth once daily 30 MINUTES BEFORE BREAKFAST   • lidocaine (LIDODERM) 5 %     Sig: Place 3 patches on the skin every 24 hours   • metFORMIN (GLUCOPHAGE) 1000 MG tablet     Sig: take 1 tablet by mouth twice a day WITH MRONING AND EVENING MEALS   • norethindrone-ethinyl estradiol-iron (MICROGESTIN FE1 5/30) 1 5-30 MG-MCG tablet     Sig: Take 1 tablet by mouth daily   • nystatin (MYCOSTATIN) powder     Sig: APPLY UNDER ABDOMINAL SKIN FOLD TWICE DAILY   • phentermine (ADIPEX-P) 37 5 MG tablet     Sig: Take 1 tablet by mouth daily before breakfast   • polyethylene glycol (GLYCOLAX) 17 GM/SCOOP powder     Sig: Take 17 g by mouth daily   • Prenatal Vit-Fe Fumarate-FA (WesTab Plus) 27-1 MG TABS     Sig: Take 1 tablet by mouth every morning   • senna-docusate sodium (SENOKOT S) 8 6-50 mg per tablet     Sig: Take 1 tablet by mouth daily   • spironolactone (ALDACTONE) 100 mg tablet     Sig: Take 100 mg by mouth 2 (two) times a day   • topiramate (TOPAMAX) 25 mg tablet     Sig: Take 25 mg by mouth 2 (two) times a day   • amitriptyline (ELAVIL) 10 mg tablet     Sig: Take 10 mg by mouth daily at bedtime   • Linzess 290 MCG CAPS     Sig: TAKE 1 CAPSULE BY MOUTH 30 MINUTES BEFORE BREAKFAST   • phentermine 37 5 MG capsule     Sig: Take 37 5 mg by mouth every morning       History of Present Illness    Ele Thopmson is a 40 y o  female with pmhx of PCOS, obesity, presenting with chronic low back pain described primarily as arthritic in nature  She describes 8/10 low back pain that is worse in the mornings and worse at the end of the day  The pain is characterized by achy, nagging, indolent, crampy, stabbing pain in her axial low back  The patient describes that the pain is worse with standing for long periods of time on hard surfaces as well as with walking  The patient is a very active individual and feels as though this pain compromises his participation with independent activities of daily living   The pain can be debilitating at times and contribute to significant disability, compromising overall activity and independent activities of daily living  She has tried physical therapy with limited relief of symptoms in the past   Medications the patient has tried in the past include nsaids, tylenol and gabapentin  She describes no radicular symptoms and has good strength  She denies any weakness numbness or paresthesias  The patient denies any bowel or bladder dysfunction, saddle anesthesia or gait instability as well  I have personally reviewed and/or updated the patient's past medical history, past surgical history, family history, social history, current medications, allergies, and vital signs today  Review of Systems   Constitutional: Positive for activity change  HENT: Negative  Eyes: Negative  Respiratory: Negative  Cardiovascular: Negative  Gastrointestinal: Negative  Endocrine: Negative  Genitourinary: Negative  Musculoskeletal: Positive for arthralgias, back pain, gait problem and myalgias  Skin: Negative  Allergic/Immunologic: Negative  Neurological: Negative for weakness and numbness  Hematological: Negative  Psychiatric/Behavioral: Negative  All other systems reviewed and are negative  Patient Active Problem List   Diagnosis   • Lumbar spondylosis   • Lumbar disc herniation       Past Medical History:   Diagnosis Date   • Arthritis        History reviewed  No pertinent surgical history  History reviewed  No pertinent family history      Social History     Occupational History   • Not on file   Tobacco Use   • Smoking status: Never   • Smokeless tobacco: Never   Vaping Use   • Vaping Use: Never used   Substance and Sexual Activity   • Alcohol use: Never   • Drug use: Never   • Sexual activity: Not on file       Current Outpatient Medications on File Prior to Visit   Medication Sig   • CYANOCOBALAMIN PO Take 1 tablet by mouth daily   • Diclofenac Sodium (VOLTAREN) 1 % Apply 2-4 grams topically two to four times daily  Apply to both knees  • fluconazole (DIFLUCAN) 150 mg tablet take 1 tablet by mouth AS A ONE TIME DOSE   • gabapentin (NEURONTIN) 600 MG tablet Take 600 mg by mouth Three times a day   • lactulose (CHRONULAC) 10 g/15 mL solution take 30 milliliters by mouth once daily if needed   • levothyroxine 25 mcg tablet take 1/2 tablet by mouth once daily 30 MINUTES BEFORE BREAKFAST   • lidocaine (LIDODERM) 5 % Place 3 patches on the skin every 24 hours   • Linzess 290 MCG CAPS TAKE 1 CAPSULE BY MOUTH 30 MINUTES BEFORE BREAKFAST   • metFORMIN (GLUCOPHAGE) 1000 MG tablet take 1 tablet by mouth twice a day WITH MRONING AND EVENING MEALS   • norethindrone-ethinyl estradiol-iron (MICROGESTIN FE1 5/30) 1 5-30 MG-MCG tablet Take 1 tablet by mouth daily   • nystatin (MYCOSTATIN) powder APPLY UNDER ABDOMINAL SKIN FOLD TWICE DAILY   • phentermine (ADIPEX-P) 37 5 MG tablet Take 1 tablet by mouth daily before breakfast   • polyethylene glycol (GLYCOLAX) 17 GM/SCOOP powder Take 17 g by mouth daily   • Prenatal Vit-Fe Fumarate-FA (WesTab Plus) 27-1 MG TABS Take 1 tablet by mouth every morning   • senna-docusate sodium (SENOKOT S) 8 6-50 mg per tablet Take 1 tablet by mouth daily   • spironolactone (ALDACTONE) 100 mg tablet Take 100 mg by mouth 2 (two) times a day   • topiramate (TOPAMAX) 25 mg tablet Take 25 mg by mouth 2 (two) times a day   • amitriptyline (ELAVIL) 10 mg tablet Take 10 mg by mouth daily at bedtime (Patient not taking: Reported on 4/5/2023)   • cyclobenzaprine (FLEXERIL) 10 mg tablet Take 10 mg by mouth (Patient not taking: Reported on 4/5/2023)   • phentermine 37 5 MG capsule Take 37 5 mg by mouth every morning (Patient not taking: Reported on 4/5/2023)   • Prenatal Vit-Iron Carbonyl-FA (prenatal multivitamin) TABS Take 1 tablet by mouth daily (Patient not taking: Reported on 4/5/2023)     No current facility-administered medications on file prior to visit         Allergies   Allergen Reactions   • "Iodinated Contrast Media Angioedema, Anaphylaxis, Hives, GI Intolerance and Tachycardia     High concentrations of food dye  • Blue Dyes (Parenteral) - Food Allergy GI Intolerance and Diarrhea   • Other GI Intolerance   • Red Dye - Food Allergy GI Intolerance and Nausea Only   • Yellow Dye - Food Allergy GI Intolerance and Nausea Only   • Food Vomiting and Rash     High amounts of food coloring     Physical Exam    BP 98/52   Pulse 100   Temp 98 °F (36 7 °C)   Resp 20   Ht 5' 6\" (1 676 m)   Wt 93 4 kg (206 lb)   BMI 33 25 kg/m²     Constitutional: normal, well developed, well nourished, alert, in no distress and non-toxic and no overt pain behavior  and obese  Eyes: anicteric  HEENT: grossly intact  Neck: supple, symmetric, trachea midline and no masses   Pulmonary:even and unlabored  Cardiovascular:No edema or pitting edema present  Skin:Normal without rashes or lesions and well hydrated  Psychiatric:Mood and affect appropriate  Neurologic:Cranial Nerves II-XII grossly intact Sensation grossly intact; no clonus negative burton's  Reflexes 2+ and brisk  SLR negative bilaterally  Musculoskeletal:normal gait  5/5 strength bilaterally with AROM in all extremities  Normal heel toe and tip toe walking  Significant pain with lumbar facet loading bilaterally and with lateral spine rotation  ttp over lumbar paraspinal muscles  Negative laith's test, negative gaenslen's negative SIJ loading bilaterally  Imaging    CLINICAL HISTORY: Sacrococcygeal disorder, back pain, left leg pain greater than right  Remote trauma  COMPARISON: Several prior xrays, the most recent from 10/23/19     TECHNIQUE:  Unenhanced MRI lumbar spine     FINDINGS:   Minimal, 1 5 mm, retrolisthesis of L4 relative to L5  Mild disc space narrowing with disc desiccation at L4-L5  Vertebral body hemangioma within L3  Vertebral body heights are normal  No edema  No fracture  The conus terminates at T12-L1       T12-L1: Normal     L1-L2: " Normal     L2-L3: Mild bilateral facet hypertrophy  No spinal or neural foraminal stenosis  L3-L4: Mild bilateral facet hypertrophy and minimal disc bulge without spinal or neural foraminal stenosis  L4-L5: Mild bilateral facet arthropathy and mild diffuse disc bulge with posterior annular tear posteriorly  No significant spinal stenosis  Minimal narrowing of the inferior neural foramina  L5-S1: Mild bilateral facet arthropathy  No spinal or neural foraminal stenosis

## 2023-04-05 NOTE — PROGRESS NOTES
Assessment  1  Lumbar spondylosis    2  Lumbar disc herniation    Axial low back pain described primarily by arthritic features  Aching, nagging, indolent, stabbing, throbbing features in axial low back without radicular components  5/5 strength bilaterally, negative SLR  Positive facet loading maneuvers in lumbar spine elicited pain, positive tenderness to palpation over lumbar paraspinal muscles  Findings correlate with prominent lumbar facet arthropathy seen throughout axial low back on MRI without significant nerve root compression; (mild disc bulge L4-L5)  Currently she is neurologically intact without gait instability, saddle anesthesia or bowel/bladder abnormality  Risks, benefits alternative to medial branch blocks and subsequent radiofrequency ablation if successful thoroughly discussed with patient  Handouts provided questions answered to patient satisfaction  Plan  -Bilateral L3, L4, L5 medial branch blocks #1; f/u 2 weeks post procedure  -gabapentin 600 mg t i d  previously ordered for patient; counseled regarding sedative effects of taking this medication and provided up titration calendar  Counseled not to take medication while driving or operating heavy machinery/using stairs  -Voltaren gel prn pain rx prn pain prescribed  Patient educated regarding bleeding risk of taking this medication as well as not taking any other nonsteroidal anti-inflammatory medications while taking this medication; counseled thoroughly regarding potential risk of Cardiovascular injury, Kidney injury, Gastrointestinal ulceration/bleeding  Patient voiced understanding  -script provided for formal physical therapy for lumbar spondylosis; Physician directed home exercise plan as per [facility] demonstrated and handouts provided that patient plans to participate with for 1 hour, twice a week for the next 6 weeks  There are risks associated with opioid medications, including dependence, addiction and tolerance   The patient understands and agrees to use these medications only as prescribed  Potential side effects of the medications include, but are not limited to, constipation, drowsiness, addiction, impaired judgment and risk of fatal overdose if not taken as prescribed  The patient was warned against driving while taking sedation medications or operating heavy machinery  The patient voiced understanding  Sharing medications is a felony  At this point in time, the patient is showing no signs of addiction, abuse, diversion or suicidal ideation  [place] Prescription Drug Monitoring Program report was reviewed and was appropriate      Complete risks and benefits including bleeding, infection, tissue reaction, nerve injury and allergic reaction were discussed  The approach was demonstrated using models and literature was provided  Verbal and written consent was obtained  My impressions and treatment recommendations were discussed in detail with the patient who verbalized understanding and had no further questions  Discharge instructions were provided  I personally saw and examined the patient and I agree with the above discussed plan of care  New Medications Ordered This Visit   Medications   • CYANOCOBALAMIN PO     Sig: Take 1 tablet by mouth daily   • Prenatal Vit-Iron Carbonyl-FA (prenatal multivitamin) TABS     Sig: Take 1 tablet by mouth daily   • cyclobenzaprine (FLEXERIL) 10 mg tablet     Sig: Take 10 mg by mouth   • Diclofenac Sodium (VOLTAREN) 1 %     Sig: Apply 2-4 grams topically two to four times daily  Apply to both knees     • fluconazole (DIFLUCAN) 150 mg tablet     Sig: take 1 tablet by mouth AS A ONE TIME DOSE   • gabapentin (NEURONTIN) 600 MG tablet     Sig: Take 600 mg by mouth Three times a day   • lactulose (CHRONULAC) 10 g/15 mL solution     Sig: take 30 milliliters by mouth once daily if needed   • levothyroxine 25 mcg tablet     Sig: take 1/2 tablet by mouth once daily 30 MINUTES BEFORE BREAKFAST   • lidocaine (LIDODERM) 5 %     Sig: Place 3 patches on the skin every 24 hours   • metFORMIN (GLUCOPHAGE) 1000 MG tablet     Sig: take 1 tablet by mouth twice a day WITH MRONING AND EVENING MEALS   • norethindrone-ethinyl estradiol-iron (MICROGESTIN FE1 5/30) 1 5-30 MG-MCG tablet     Sig: Take 1 tablet by mouth daily   • nystatin (MYCOSTATIN) powder     Sig: APPLY UNDER ABDOMINAL SKIN FOLD TWICE DAILY   • phentermine (ADIPEX-P) 37 5 MG tablet     Sig: Take 1 tablet by mouth daily before breakfast   • polyethylene glycol (GLYCOLAX) 17 GM/SCOOP powder     Sig: Take 17 g by mouth daily   • Prenatal Vit-Fe Fumarate-FA (WesTab Plus) 27-1 MG TABS     Sig: Take 1 tablet by mouth every morning   • senna-docusate sodium (SENOKOT S) 8 6-50 mg per tablet     Sig: Take 1 tablet by mouth daily   • spironolactone (ALDACTONE) 100 mg tablet     Sig: Take 100 mg by mouth 2 (two) times a day   • topiramate (TOPAMAX) 25 mg tablet     Sig: Take 25 mg by mouth 2 (two) times a day   • amitriptyline (ELAVIL) 10 mg tablet     Sig: Take 10 mg by mouth daily at bedtime   • Linzess 290 MCG CAPS     Sig: TAKE 1 CAPSULE BY MOUTH 30 MINUTES BEFORE BREAKFAST   • phentermine 37 5 MG capsule     Sig: Take 37 5 mg by mouth every morning       History of Present Illness    Severiano Gourd is a 40 y o  female with pmhx of PCOS, obesity, presenting with chronic low back pain described primarily as arthritic in nature  She describes 8/10 low back pain that is worse in the mornings and worse at the end of the day  The pain is characterized by achy, nagging, indolent, crampy, stabbing pain in her axial low back  The patient describes that the pain is worse with standing for long periods of time on hard surfaces as well as with walking  The patient is a very active individual and feels as though this pain compromises his participation with independent activities of daily living   The pain can be debilitating at times and contribute to significant disability, compromising overall activity and independent activities of daily living  She has tried physical therapy with limited relief of symptoms in the past   Medications the patient has tried in the past include nsaids, tylenol and gabapentin  She describes no radicular symptoms and has good strength  She denies any weakness numbness or paresthesias  The patient denies any bowel or bladder dysfunction, saddle anesthesia or gait instability as well  I have personally reviewed and/or updated the patient's past medical history, past surgical history, family history, social history, current medications, allergies, and vital signs today  Review of Systems   Constitutional: Positive for activity change  HENT: Negative  Eyes: Negative  Respiratory: Negative  Cardiovascular: Negative  Gastrointestinal: Negative  Endocrine: Negative  Genitourinary: Negative  Musculoskeletal: Positive for arthralgias, back pain, gait problem and myalgias  Skin: Negative  Allergic/Immunologic: Negative  Neurological: Negative for weakness and numbness  Hematological: Negative  Psychiatric/Behavioral: Negative  All other systems reviewed and are negative  Patient Active Problem List   Diagnosis   • Lumbar spondylosis   • Lumbar disc herniation       Past Medical History:   Diagnosis Date   • Arthritis        History reviewed  No pertinent surgical history  History reviewed  No pertinent family history      Social History     Occupational History   • Not on file   Tobacco Use   • Smoking status: Never   • Smokeless tobacco: Never   Vaping Use   • Vaping Use: Never used   Substance and Sexual Activity   • Alcohol use: Never   • Drug use: Never   • Sexual activity: Not on file       Current Outpatient Medications on File Prior to Visit   Medication Sig   • CYANOCOBALAMIN PO Take 1 tablet by mouth daily   • Diclofenac Sodium (VOLTAREN) 1 % Apply 2-4 grams topically two to four times daily  Apply to both knees  • fluconazole (DIFLUCAN) 150 mg tablet take 1 tablet by mouth AS A ONE TIME DOSE   • gabapentin (NEURONTIN) 600 MG tablet Take 600 mg by mouth Three times a day   • lactulose (CHRONULAC) 10 g/15 mL solution take 30 milliliters by mouth once daily if needed   • levothyroxine 25 mcg tablet take 1/2 tablet by mouth once daily 30 MINUTES BEFORE BREAKFAST   • lidocaine (LIDODERM) 5 % Place 3 patches on the skin every 24 hours   • Linzess 290 MCG CAPS TAKE 1 CAPSULE BY MOUTH 30 MINUTES BEFORE BREAKFAST   • metFORMIN (GLUCOPHAGE) 1000 MG tablet take 1 tablet by mouth twice a day WITH MRONING AND EVENING MEALS   • norethindrone-ethinyl estradiol-iron (MICROGESTIN FE1 5/30) 1 5-30 MG-MCG tablet Take 1 tablet by mouth daily   • nystatin (MYCOSTATIN) powder APPLY UNDER ABDOMINAL SKIN FOLD TWICE DAILY   • phentermine (ADIPEX-P) 37 5 MG tablet Take 1 tablet by mouth daily before breakfast   • polyethylene glycol (GLYCOLAX) 17 GM/SCOOP powder Take 17 g by mouth daily   • Prenatal Vit-Fe Fumarate-FA (WesTab Plus) 27-1 MG TABS Take 1 tablet by mouth every morning   • senna-docusate sodium (SENOKOT S) 8 6-50 mg per tablet Take 1 tablet by mouth daily   • spironolactone (ALDACTONE) 100 mg tablet Take 100 mg by mouth 2 (two) times a day   • topiramate (TOPAMAX) 25 mg tablet Take 25 mg by mouth 2 (two) times a day   • amitriptyline (ELAVIL) 10 mg tablet Take 10 mg by mouth daily at bedtime (Patient not taking: Reported on 4/5/2023)   • cyclobenzaprine (FLEXERIL) 10 mg tablet Take 10 mg by mouth (Patient not taking: Reported on 4/5/2023)   • phentermine 37 5 MG capsule Take 37 5 mg by mouth every morning (Patient not taking: Reported on 4/5/2023)   • Prenatal Vit-Iron Carbonyl-FA (prenatal multivitamin) TABS Take 1 tablet by mouth daily (Patient not taking: Reported on 4/5/2023)     No current facility-administered medications on file prior to visit         Allergies   Allergen Reactions   • "Iodinated Contrast Media Angioedema, Anaphylaxis, Hives, GI Intolerance and Tachycardia     High concentrations of food dye  • Blue Dyes (Parenteral) - Food Allergy GI Intolerance and Diarrhea   • Other GI Intolerance   • Red Dye - Food Allergy GI Intolerance and Nausea Only   • Yellow Dye - Food Allergy GI Intolerance and Nausea Only   • Food Vomiting and Rash     High amounts of food coloring     Physical Exam    BP 98/52   Pulse 100   Temp 98 °F (36 7 °C)   Resp 20   Ht 5' 6\" (1 676 m)   Wt 93 4 kg (206 lb)   BMI 33 25 kg/m²     Constitutional: normal, well developed, well nourished, alert, in no distress and non-toxic and no overt pain behavior  and obese  Eyes: anicteric  HEENT: grossly intact  Neck: supple, symmetric, trachea midline and no masses   Pulmonary:even and unlabored  Cardiovascular:No edema or pitting edema present  Skin:Normal without rashes or lesions and well hydrated  Psychiatric:Mood and affect appropriate  Neurologic:Cranial Nerves II-XII grossly intact Sensation grossly intact; no clonus negative burton's  Reflexes 2+ and brisk  SLR negative bilaterally  Musculoskeletal:normal gait  5/5 strength bilaterally with AROM in all extremities  Normal heel toe and tip toe walking  Significant pain with lumbar facet loading bilaterally and with lateral spine rotation  ttp over lumbar paraspinal muscles  Negative laith's test, negative gaenslen's negative SIJ loading bilaterally  Imaging    CLINICAL HISTORY: Sacrococcygeal disorder, back pain, left leg pain greater than right  Remote trauma  COMPARISON: Several prior xrays, the most recent from 10/23/19     TECHNIQUE:  Unenhanced MRI lumbar spine     FINDINGS:   Minimal, 1 5 mm, retrolisthesis of L4 relative to L5  Mild disc space narrowing with disc desiccation at L4-L5  Vertebral body hemangioma within L3  Vertebral body heights are normal  No edema  No fracture  The conus terminates at T12-L1       T12-L1: Normal     L1-L2: " Normal     L2-L3: Mild bilateral facet hypertrophy  No spinal or neural foraminal stenosis  L3-L4: Mild bilateral facet hypertrophy and minimal disc bulge without spinal or neural foraminal stenosis  L4-L5: Mild bilateral facet arthropathy and mild diffuse disc bulge with posterior annular tear posteriorly  No significant spinal stenosis  Minimal narrowing of the inferior neural foramina  L5-S1: Mild bilateral facet arthropathy  No spinal or neural foraminal stenosis

## 2023-04-05 NOTE — PATIENT INSTRUCTIONS
Core Strengthening Exercises   WHAT YOU NEED TO KNOW:   Your core includes the muscles of your lower back, hip, pelvis, and abdomen  Core strengthening exercises help heal and strengthen these muscles  This helps prevent another injury, and keeps your pelvis, spine, and hips in the correct position  DISCHARGE INSTRUCTIONS:   Call your doctor or physical therapist if:   You have sharp or worsening pain during exercise or at rest     You have questions or concerns about your shoulder exercises  Safety tips:  Talk to your healthcare provider before you start an exercise program  A physical therapist can teach you how to do core strengthening exercises safely  Do the exercises on a mat or firm surface  A firm surface will support your spine and prevent low back pain  Do not do these exercises on a bed  Move slowly and smoothly  Avoid fast or jerky motions  Stop if you feel pain  You may feel some discomfort at first, but you should not feel pain  Tell your provider or physical therapist if you have pain while you exercise  Regular exercise will help decrease your discomfort over time  Breathe normally during core exercises  Do not hold your breath  This may cause an increase in blood pressure and prevent muscle strengthening  Your healthcare provider will tell you when to inhale and exhale during the exercise  Begin all of your exercises with abdominal bracing  Abdominal bracing helps warm up your core muscles  You can also practice abdominal bracing throughout the day  Lie on your back with your knees bent and feet flat on the floor  Place your arms in a relaxed position beside your body  Tighten your abdominal muscles  Pull your belly button in and up toward your spine  Hold for 5 seconds  Relax your muscles  Repeat 10 times  Core strengthening exercises: Your healthcare provider will tell you how often to do these exercises   The provider will also tell you how many repetitions of each exercise you should do  Hold each exercise for 5 seconds or as directed  As you get stronger, increase your hold to 10 to 15 seconds  You can do some of these exercises on a stability ball, or with a weight  Ask your healthcare provider how to use a stability ball or weight for these exercises:  Bridging:  Lie on your back with your knees bent and feet flat on the floor  Rest your arms at your side  Tighten your buttocks, and then lift your hips 1 inch off the floor  Hold for 5 seconds  When you can do this exercise without pain for 10 seconds, increase the distance you lift your hips  A good goal is to be able to lift your hips so that your shoulders, hips, and knees are in a straight line  Dead bug:  Lie on your back with your knees bent and feet flat on the floor  Place your arms in a relaxed position beside your body  Begin with abdominal bracing  Next, raise one leg, keeping your knee bent  Hold for 5 seconds  Repeat with the other leg  When you can do this exercise without pain for 10 to 15 seconds, you may raise one straight leg and hold  Repeat with the other leg  Quadruped:  Place your hands and knees on the floor  Keep your wrists directly below your shoulders and your knees directly below your hips  Pull your belly button in toward your spine  Do not flatten or arch your back  Tighten your abdominal muscles below your belly button  Hold for 5 seconds  When you can do this exercise without pain for 10 to 15 seconds, you may extend one arm and hold  Repeat on the other side  Side bridge exercises:      Standing side bridge:  Stand next to a wall and extend one arm toward the wall  Place your palm flat on the wall with your fingers pointing upward  Begin with abdominal bracing  Next, without moving your feet, slowly bend your arm to 90 degrees  Hold for 5 seconds  Repeat on the other side   When you can do this exercise without pain for 10 to 15 seconds, you may do the bent leg side bridge on the floor  Bent leg side bridge:  Lie on one side with your legs, hips, and shoulders in a straight line  Prop yourself up onto your forearm so your elbow is directly below your shoulder  Bend your knees back to 90 degrees  Begin with abdominal bracing  Next, lift your hips and balance yourself on your forearm and knees  Hold for 5 seconds  Repeat on the other side  When you can do this exercise without pain for 10 to 15 seconds, you may do the straight leg side bridge on the floor  Straight leg side bridge:  Lie on one side with your legs, hips, and shoulders in a straight line  Prop yourself up onto your forearm so your elbow is directly below your shoulder  Begin with abdominal bracing  Lift your hips off the floor and balance yourself on your forearm and the outside of your flexed foot  Do not let your ankle bend sideways  Hold for 5 seconds  Repeat on the other side  When you can do this exercise without pain for 10 to 15 seconds, ask your healthcare provider for more advanced exercises  Superman:  Lie on your stomach  Extend your arms forward on the floor  Tighten your abdominal muscles and lift your right hand and left leg off the floor  Hold this position  Slowly return to the starting position  Tighten your abdominal muscles and lift your left hand and right leg off the floor  Hold this position  Slowly return to the starting position  Clam:  Lie on your side with your knees bent  Put your bottom arm under your head to keep your neck in line  Put your top hand on your hip to keep your pelvis from moving  Put your heels together, and keep them together during this exercise  Slowly raise your top knee toward the ceiling  Then lower your leg so your knees are together  Repeat this exercise 10 times  Then switch sides and do the exercise 10 times with the other leg  Curl up:  Lie on your back with your knees bent and feet flat on the floor   Place your hands, palms down, underneath your lower back  Next, with your elbows on the floor, lift your shoulders and chest 2 to 3 inches off the floor  Keep your head in line with your shoulders  Hold this position  Slowly return to the starting position  Straight leg raises:  Lie on your back with one leg straight  Bend the other knee and place your foot flat on the floor  Tighten your abdominal muscles  Keep your leg straight and slowly lift it straight up 6 to 12 inches off the floor  Hold this position  Lower your leg slowly  Do as many repetitions as directed on this side  Repeat with the other leg  Plank:  Lie on your stomach  Bend your elbows and place your forearms flat on the floor  Lift your chest, stomach, and knees off the floor  Make sure your elbows are below your shoulders  Your body should be in a straight line  Do not let your hips or lower back sink to the ground  Squeeze your abdominal muscles together and hold for 15 seconds  To make this exercise harder, hold for 30 seconds or lift 1 leg at a time  Bicycles:  Lie on your back  Bend both knees and bring them toward your chest  Your calves should be parallel to the floor  Place the palms of your hands on the back of your head  Straighten your right leg and keep it lifted 2 inches off the floor  Raise your head and shoulders off the floor and twist towards your left  Keep your head and shoulders lifted  Bend your right knee while you straighten your left leg  Keep your left leg 2 inches off the floor  Twist your head and chest towards the left leg  Continue to straighten 1 leg at a time and twist        Follow up with your doctor or physical therapist as directed:  Write down your questions so you remember to ask them during your visits  © Copyright [facility] 2022 Information is for End User's use only and may not be sold, redistributed or otherwise used for commercial purposes  The above information is an  only   It is not intended as medical advice for individual conditions or treatments  Talk to your doctor, nurse or pharmacist before following any medical regimen to see if it is safe and effective for you

## 2023-04-05 NOTE — LETTER
April 6, 2023     Bhavik Crawley, [address] [address]    Patient: Papa Chinchilla   YOB: 1985   Date of Visit: 4/5/2023       Dear Dr Aliya Ramírez: Thank you for referring Satish Piper to me for evaluation  Below are my notes for this consultation  If you have questions, please do not hesitate to call me  I look forward to following your patient along with you  Sincerely,        Juanis Landeros MD        CC: No Recipients  Juanis Landeros MD  4/5/2023 10:28 AM  Signed      Assessment  1  Lumbar spondylosis    2  Lumbar disc herniation    Axial low back pain described primarily by arthritic features  Aching, nagging, indolent, stabbing, throbbing features in axial low back without radicular components  5/5 strength bilaterally, negative SLR  Positive facet loading maneuvers in lumbar spine elicited pain, positive tenderness to palpation over lumbar paraspinal muscles  Findings correlate with prominent lumbar facet arthropathy seen throughout axial low back on MRI without significant nerve root compression; (mild disc bulge L4-L5)  Currently she is neurologically intact without gait instability, saddle anesthesia or bowel/bladder abnormality  Risks, benefits alternative to medial branch blocks and subsequent radiofrequency ablation if successful thoroughly discussed with patient  Handouts provided questions answered to patient satisfaction  Plan  -Bilateral L3, L4, L5 medial branch blocks #1; f/u 2 weeks post procedure  -gabapentin 600 mg t i d  previously ordered for patient; counseled regarding sedative effects of taking this medication and provided up titration calendar  Counseled not to take medication while driving or operating heavy machinery/using stairs  -Voltaren gel prn pain rx prn pain prescribed    Patient educated regarding bleeding risk of taking this medication as well as not taking any other nonsteroidal anti-inflammatory medications while taking this medication; counseled thoroughly regarding potential risk of Cardiovascular injury, Kidney injury, Gastrointestinal ulceration/bleeding  Patient voiced understanding  -script provided for formal physical therapy for lumbar spondylosis; Physician directed home exercise plan as per [facility] demonstrated and handouts provided that patient plans to participate with for 1 hour, twice a week for the next 6 weeks  There are risks associated with opioid medications, including dependence, addiction and tolerance  The patient understands and agrees to use these medications only as prescribed  Potential side effects of the medications include, but are not limited to, constipation, drowsiness, addiction, impaired judgment and risk of fatal overdose if not taken as prescribed  The patient was warned against driving while taking sedation medications or operating heavy machinery  The patient voiced understanding  Sharing medications is a felony  At this point in time, the patient is showing no signs of addiction, abuse, diversion or suicidal ideation  [place] Prescription Drug Monitoring Program report was reviewed and was appropriate      Complete risks and benefits including bleeding, infection, tissue reaction, nerve injury and allergic reaction were discussed  The approach was demonstrated using models and literature was provided  Verbal and written consent was obtained  My impressions and treatment recommendations were discussed in detail with the patient who verbalized understanding and had no further questions  Discharge instructions were provided  I personally saw and examined the patient and I agree with the above discussed plan of care      New Medications Ordered This Visit   Medications   • CYANOCOBALAMIN PO     Sig: Take 1 tablet by mouth daily   • Prenatal Vit-Iron Carbonyl-FA (prenatal multivitamin) TABS     Sig: Take 1 tablet by mouth daily   • cyclobenzaprine (FLEXERIL) 10 mg tablet     Sig: Take 10 mg by mouth   • Diclofenac Sodium (VOLTAREN) 1 %     Sig: Apply 2-4 grams topically two to four times daily  Apply to both knees     • fluconazole (DIFLUCAN) 150 mg tablet     Sig: take 1 tablet by mouth AS A ONE TIME DOSE   • gabapentin (NEURONTIN) 600 MG tablet     Sig: Take 600 mg by mouth Three times a day   • lactulose (CHRONULAC) 10 g/15 mL solution     Sig: take 30 milliliters by mouth once daily if needed   • levothyroxine 25 mcg tablet     Sig: take 1/2 tablet by mouth once daily 30 MINUTES BEFORE BREAKFAST   • lidocaine (LIDODERM) 5 %     Sig: Place 3 patches on the skin every 24 hours   • metFORMIN (GLUCOPHAGE) 1000 MG tablet     Sig: take 1 tablet by mouth twice a day WITH MRONING AND EVENING MEALS   • norethindrone-ethinyl estradiol-iron (MICROGESTIN FE1 5/30) 1 5-30 MG-MCG tablet     Sig: Take 1 tablet by mouth daily   • nystatin (MYCOSTATIN) powder     Sig: APPLY UNDER ABDOMINAL SKIN FOLD TWICE DAILY   • phentermine (ADIPEX-P) 37 5 MG tablet     Sig: Take 1 tablet by mouth daily before breakfast   • polyethylene glycol (GLYCOLAX) 17 GM/SCOOP powder     Sig: Take 17 g by mouth daily   • Prenatal Vit-Fe Fumarate-FA (WesTab Plus) 27-1 MG TABS     Sig: Take 1 tablet by mouth every morning   • senna-docusate sodium (SENOKOT S) 8 6-50 mg per tablet     Sig: Take 1 tablet by mouth daily   • spironolactone (ALDACTONE) 100 mg tablet     Sig: Take 100 mg by mouth 2 (two) times a day   • topiramate (TOPAMAX) 25 mg tablet     Sig: Take 25 mg by mouth 2 (two) times a day   • amitriptyline (ELAVIL) 10 mg tablet     Sig: Take 10 mg by mouth daily at bedtime   • Linzess 290 MCG CAPS     Sig: TAKE 1 CAPSULE BY MOUTH 30 MINUTES BEFORE BREAKFAST   • phentermine 37 5 MG capsule     Sig: Take 37 5 mg by mouth every morning       History of Present Illness    Demond Stacy is a 40 y o  female with pmhx of PCOS, obesity, presenting with chronic low back pain described primarily as arthritic in nature  She describes 8/10 low back pain that is worse in the mornings and worse at the end of the day  The pain is characterized by achy, nagging, indolent, crampy, stabbing pain in her axial low back  The patient describes that the pain is worse with standing for long periods of time on hard surfaces as well as with walking  The patient is a very active individual and feels as though this pain compromises his participation with independent activities of daily living  The pain can be debilitating at times and contribute to significant disability, compromising overall activity and independent activities of daily living  She has tried physical therapy with limited relief of symptoms in the past   Medications the patient has tried in the past include nsaids, tylenol and gabapentin  She describes no radicular symptoms and has good strength  She denies any weakness numbness or paresthesias  The patient denies any bowel or bladder dysfunction, saddle anesthesia or gait instability as well  I have personally reviewed and/or updated the patient's past medical history, past surgical history, family history, social history, current medications, allergies, and vital signs today  Review of Systems   Constitutional: Positive for activity change  HENT: Negative  Eyes: Negative  Respiratory: Negative  Cardiovascular: Negative  Gastrointestinal: Negative  Endocrine: Negative  Genitourinary: Negative  Musculoskeletal: Positive for arthralgias, back pain, gait problem and myalgias  Skin: Negative  Allergic/Immunologic: Negative  Neurological: Negative for weakness and numbness  Hematological: Negative  Psychiatric/Behavioral: Negative  All other systems reviewed and are negative  Patient Active Problem List   Diagnosis   • Lumbar spondylosis   • Lumbar disc herniation       Past Medical History:   Diagnosis Date   • Arthritis        History reviewed   No pertinent surgical history  History reviewed  No pertinent family history  Social History     Occupational History   • Not on file   Tobacco Use   • Smoking status: Never   • Smokeless tobacco: Never   Vaping Use   • Vaping Use: Never used   Substance and Sexual Activity   • Alcohol use: Never   • Drug use: Never   • Sexual activity: Not on file       Current Outpatient Medications on File Prior to Visit   Medication Sig   • CYANOCOBALAMIN PO Take 1 tablet by mouth daily   • Diclofenac Sodium (VOLTAREN) 1 % Apply 2-4 grams topically two to four times daily  Apply to both knees     • fluconazole (DIFLUCAN) 150 mg tablet take 1 tablet by mouth AS A ONE TIME DOSE   • gabapentin (NEURONTIN) 600 MG tablet Take 600 mg by mouth Three times a day   • lactulose (CHRONULAC) 10 g/15 mL solution take 30 milliliters by mouth once daily if needed   • levothyroxine 25 mcg tablet take 1/2 tablet by mouth once daily 30 MINUTES BEFORE BREAKFAST   • lidocaine (LIDODERM) 5 % Place 3 patches on the skin every 24 hours   • Linzess 290 MCG CAPS TAKE 1 CAPSULE BY MOUTH 30 MINUTES BEFORE BREAKFAST   • metFORMIN (GLUCOPHAGE) 1000 MG tablet take 1 tablet by mouth twice a day WITH MRONING AND EVENING MEALS   • norethindrone-ethinyl estradiol-iron (MICROGESTIN FE1 5/30) 1 5-30 MG-MCG tablet Take 1 tablet by mouth daily   • nystatin (MYCOSTATIN) powder APPLY UNDER ABDOMINAL SKIN FOLD TWICE DAILY   • phentermine (ADIPEX-P) 37 5 MG tablet Take 1 tablet by mouth daily before breakfast   • polyethylene glycol (GLYCOLAX) 17 GM/SCOOP powder Take 17 g by mouth daily   • Prenatal Vit-Fe Fumarate-FA (WesTab Plus) 27-1 MG TABS Take 1 tablet by mouth every morning   • senna-docusate sodium (SENOKOT S) 8 6-50 mg per tablet Take 1 tablet by mouth daily   • spironolactone (ALDACTONE) 100 mg tablet Take 100 mg by mouth 2 (two) times a day   • topiramate (TOPAMAX) 25 mg tablet Take 25 mg by mouth 2 (two) times a day   • amitriptyline (ELAVIL) 10 mg tablet "Take 10 mg by mouth daily at bedtime (Patient not taking: Reported on 4/5/2023)   • cyclobenzaprine (FLEXERIL) 10 mg tablet Take 10 mg by mouth (Patient not taking: Reported on 4/5/2023)   • phentermine 37 5 MG capsule Take 37 5 mg by mouth every morning (Patient not taking: Reported on 4/5/2023)   • Prenatal Vit-Iron Carbonyl-FA (prenatal multivitamin) TABS Take 1 tablet by mouth daily (Patient not taking: Reported on 4/5/2023)     No current facility-administered medications on file prior to visit  Allergies   Allergen Reactions   • Iodinated Contrast Media Angioedema, Anaphylaxis, Hives, GI Intolerance and Tachycardia     High concentrations of food dye  • Blue Dyes (Parenteral) - Food Allergy GI Intolerance and Diarrhea   • Other GI Intolerance   • Red Dye - Food Allergy GI Intolerance and Nausea Only   • Yellow Dye - Food Allergy GI Intolerance and Nausea Only   • Food Vomiting and Rash     High amounts of food coloring     Physical Exam    BP 98/52   Pulse 100   Temp 98 °F (36 7 °C)   Resp 20   Ht 5' 6\" (1 676 m)   Wt 93 4 kg (206 lb)   BMI 33 25 kg/m²     Constitutional: normal, well developed, well nourished, alert, in no distress and non-toxic and no overt pain behavior  and obese  Eyes: anicteric  HEENT: grossly intact  Neck: supple, symmetric, trachea midline and no masses   Pulmonary:even and unlabored  Cardiovascular:No edema or pitting edema present  Skin:Normal without rashes or lesions and well hydrated  Psychiatric:Mood and affect appropriate  Neurologic:Cranial Nerves II-XII grossly intact Sensation grossly intact; no clonus negative burton's  Reflexes 2+ and brisk  SLR negative bilaterally  Musculoskeletal:normal gait  5/5 strength bilaterally with AROM in all extremities  Normal heel toe and tip toe walking  Significant pain with lumbar facet loading bilaterally and with lateral spine rotation  ttp over lumbar paraspinal muscles   Negative laith's test, negative gaenslen's " negative SIJ loading bilaterally  Imaging    CLINICAL HISTORY: Sacrococcygeal disorder, back pain, left leg pain greater than right  Remote trauma  COMPARISON: Several prior xrays, the most recent from 10/23/19     TECHNIQUE:  Unenhanced MRI lumbar spine     FINDINGS:   Minimal, 1 5 mm, retrolisthesis of L4 relative to L5  Mild disc space narrowing with disc desiccation at L4-L5  Vertebral body hemangioma within L3  Vertebral body heights are normal  No edema  No fracture  The conus terminates at T12-L1  T12-L1: Normal     L1-L2: Normal     L2-L3: Mild bilateral facet hypertrophy  No spinal or neural foraminal stenosis  L3-L4: Mild bilateral facet hypertrophy and minimal disc bulge without spinal or neural foraminal stenosis  L4-L5: Mild bilateral facet arthropathy and mild diffuse disc bulge with posterior annular tear posteriorly  No significant spinal stenosis  Minimal narrowing of the inferior neural foramina  L5-S1: Mild bilateral facet arthropathy  No spinal or neural foraminal stenosis

## 2023-04-06 ENCOUNTER — HOSPITAL ENCOUNTER (OUTPATIENT)
Facility: HOSPITAL | Age: 38
Setting detail: OUTPATIENT SURGERY
Discharge: HOME | End: 2023-04-06
Attending: ANESTHESIOLOGY | Admitting: ANESTHESIOLOGY

## 2023-04-06 ENCOUNTER — APPOINTMENT (OUTPATIENT)
Dept: RADIOLOGY | Facility: HOSPITAL | Age: 38
End: 2023-04-06

## 2023-04-06 VITALS
WEIGHT: 206 LBS | DIASTOLIC BLOOD PRESSURE: 66 MMHG | OXYGEN SATURATION: 100 % | RESPIRATION RATE: 20 BRPM | HEIGHT: 66 IN | TEMPERATURE: 98.5 F | HEART RATE: 79 BPM | BODY MASS INDEX: 33.11 KG/M2 | SYSTOLIC BLOOD PRESSURE: 110 MMHG

## 2023-04-06 LAB
EXT PREGNANCY TEST URINE: NEGATIVE
EXT. CONTROL: NORMAL

## 2023-04-06 RX ORDER — LIDOCAINE HYDROCHLORIDE 10 MG/ML
10 INJECTION, SOLUTION EPIDURAL; INFILTRATION; INTRACAUDAL; PERINEURAL ONCE
Status: COMPLETED | OUTPATIENT
Start: 2023-04-06 | End: 2023-04-06

## 2023-04-06 RX ORDER — BUPIVACAINE HCL/PF 2.5 MG/ML
10 VIAL (ML) INJECTION ONCE
Status: DISCONTINUED | OUTPATIENT
Start: 2023-04-06 | End: 2023-04-06 | Stop reason: HOSPADM

## 2023-04-06 RX ORDER — METHYLPREDNISOLONE ACETATE 80 MG/ML
80 INJECTION, SUSPENSION INTRA-ARTICULAR; INTRALESIONAL; INTRAMUSCULAR; PARENTERAL; SOFT TISSUE ONCE
Status: COMPLETED | OUTPATIENT
Start: 2023-04-06 | End: 2023-04-06

## 2023-04-06 RX ORDER — BUPIVACAINE HYDROCHLORIDE 2.5 MG/ML
INJECTION, SOLUTION EPIDURAL; INFILTRATION; INTRACAUDAL AS NEEDED
Status: DISCONTINUED | OUTPATIENT
Start: 2023-04-06 | End: 2023-04-06 | Stop reason: HOSPADM

## 2023-04-06 NOTE — INTERVAL H&P NOTE
H&P reviewed  After examining the patient I find no changes in the patients condition since the H&P had been written      Vitals:    04/06/23 1056   BP: 112/77   Pulse: 98   Resp: 18   Temp: 98 8 °F (37 1 °C)   SpO2: 99%

## 2023-04-06 NOTE — OP NOTE
OPERATIVE REPORT  PATIENT NAME: Renato Macedo    :  1985  MRN: [id number]  Pt Location:  GI ROOM 01    SURGERY DATE: 2023    Surgeon(s) and Role: Gracie Lucas MD - Primary    Preop Diagnosis:  Lumbar spondylosis [M47 816]    Post-Op Diagnosis Codes:     * Lumbar spondylosis [M47 816]    Procedure(s):  Bilateral - BLOCK MEDIAL BRANCH L3  L4  L5 #1    Specimen(s):  * No specimens in log *    Estimated Blood Loss:   Minimal    Drains:  * No LDAs found *    Anesthesia Type:   Local    Operative Indications:  Lumbar spondylosis [M47 816]    Operative Findings:  Bilateral L3, L4, L5 medial branch nerve regions identified under fluoroscopic guidance       Complications:   None    Procedure and Technique:  Please see detailed procedure note    I was present for the entire procedure    Patient Disposition:  PACU     SIGNATURE: Vernell Davila MD  DATE: 2023  TIME: 11:24 AM

## 2023-04-06 NOTE — PROCEDURES
Pre-procedure Diagnosis: Lumbar Facet Arthropathy  Post-procedure Diagnosis: Lumbar Facet Arthropathy  Procedure Title(s):  [BILATERAL L3, L4, L5] medial branch nerve blocks [(DIAGNOSTIC)]  Attending Surgeon:   Ermias Wadsworth MD  Anesthesia:   Local     Indications: The patient is a 40y o  year-old female with a diagnosis of lumbar facet arthropathy  The patient's history and physical exam were reviewed  The risks, benefits and alternatives to the procedure were discussed, and all questions were answered to the patient's satisfaction  The patient agreed to proceed, and written informed consent was obtained  Procedure in Detail: The patient was brought into the procedure room and placed in the prone position on the fluoroscopy table  The area of the lumbar spine was prepped with chloraprep and then draped in a sterile manner  AP fluoroscopy was used to identify the [L3-L5] levels on the [LEFT] side  The C-arm was obliqued to visualize the junction of the superior articulate process and transverse process  The sacral ala was identified and marked  The skin in these identified areas was anesthetized with 1% lidocaine  A 22-gauge, 3½-inch spinal needle was advanced toward each of these points under fluoroscopic guidance  Once bone was contacted, negative aspiration was confirmed and [1-mL] of a [6mL]mixture of [5mL] [0 25% bupivicaine] and 1mL of 80mg/mL Depomedrol was injected at each level  (The same procedure was performed on the RIGHT side )    After the procedure was completed, the patient's back was cleaned and bandages were placed at the needle insertion sites  Disposition: The patient tolerated the procedure well, and there were no apparent complications  The patient was taken to the recovery area where written discharge instructions for the procedure were given  The patient was given a pain diary to determine if the patient's pain improves following the injection   Once the diary is returned we will consider next appropriate course of treatment      Postoperative pain relief [WAS] significant    Estimated Blood Loss: None  Specimens Obtained: N/A

## 2023-08-15 ENCOUNTER — DOCTOR'S OFFICE (OUTPATIENT)
Dept: URBAN - NONMETROPOLITAN AREA CLINIC 1 | Facility: CLINIC | Age: 38
Setting detail: OPHTHALMOLOGY
End: 2023-08-15
Payer: COMMERCIAL

## 2023-08-15 ENCOUNTER — OPTICAL OFFICE (OUTPATIENT)
Dept: URBAN - NONMETROPOLITAN AREA CLINIC 4 | Facility: CLINIC | Age: 38
Setting detail: OPHTHALMOLOGY
End: 2023-08-15
Payer: COMMERCIAL

## 2023-08-15 DIAGNOSIS — H52.13: ICD-10-CM

## 2023-08-15 DIAGNOSIS — E11.3293: ICD-10-CM

## 2023-08-15 DIAGNOSIS — H40.013: ICD-10-CM

## 2023-08-15 DIAGNOSIS — H25.013: ICD-10-CM

## 2023-08-15 PROCEDURE — 92083 EXTENDED VISUAL FIELD XM: CPT | Performed by: OPTOMETRIST

## 2023-08-15 PROCEDURE — 92014 COMPRE OPH EXAM EST PT 1/>: CPT | Performed by: OPTOMETRIST

## 2023-08-15 PROCEDURE — V2103 SPHEROCYLINDR 4.00D/12-2.00D: HCPCS | Performed by: OPTOMETRIST

## 2023-08-15 PROCEDURE — V2020 VISION SVCS FRAMES PURCHASES: HCPCS | Performed by: OPTOMETRIST

## 2023-08-15 ASSESSMENT — PACHYMETRY
OD_CT_CORRECTION: -4
OS_CT_CORRECTION: -4
OS_CT_UM: 597
OD_CT_UM: 591

## 2023-08-15 ASSESSMENT — VASCULARIZATION
OD_VASCULARIZATION: PANNUS
OS_VASCULARIZATION: PANNUS

## 2023-08-15 ASSESSMENT — CONFRONTATIONAL VISUAL FIELD TEST (CVF)
OD_FINDINGS: FULL
OS_FINDINGS: FULL

## 2023-08-15 ASSESSMENT — SUPERFICIAL PUNCTATE KERATITIS (SPK)
OD_SPK: T
OS_SPK: 1+

## 2023-08-15 ASSESSMENT — TONOMETRY
OD_IOP_MMHG: 15
OS_IOP_MMHG: 15

## 2023-08-16 PROBLEM — E11.3293 DM TYPE 2; BOTH MILD WITHOUT ME: Status: ACTIVE | Noted: 2023-08-15

## 2023-08-16 ASSESSMENT — REFRACTION_MANIFEST
OS_VA2: 20/25
OS_VA1: 20/20
OD_CYLINDER: -1.75
OD_VA1: 20/20
OD_AXIS: 010
OD_VA2: 20/25
OS_SPHERE: -2.50
OS_CYLINDER: -2.00
OD_SPHERE: -2.50
OS_AXIS: 180

## 2023-08-16 ASSESSMENT — AXIALLENGTH_DERIVED
OD_AL: 24.6731
OD_AL: 24.7801
OS_AL: 24.5054
OS_AL: 24.9876

## 2023-08-16 ASSESSMENT — VISUAL ACUITY
OD_BCVA: 20/20
OS_BCVA: 20/20

## 2023-08-16 ASSESSMENT — REFRACTION_CURRENTRX
OD_OVR_VA: 20/
OS_OVR_VA: 20/
OD_SPHERE: -2.50
OS_CYLINDER: -1.50
OS_AXIS: 001
OD_CYLINDER: -1.50
OS_VPRISM_DIRECTION: SV
OD_VPRISM_DIRECTION: SV
OS_SPHERE: -2.50
OD_AXIS: 007

## 2023-08-16 ASSESSMENT — SPHEQUIV_DERIVED
OD_SPHEQUIV: -3.125
OD_SPHEQUIV: -3.375
OS_SPHEQUIV: -3.5
OS_SPHEQUIV: -2.375

## 2023-08-16 ASSESSMENT — REFRACTION_AUTOREFRACTION
OS_AXIS: 174
OD_SPHERE: -2.25
OD_CYLINDER: -1.75
OS_CYLINDER: -1.75
OS_SPHERE: -1.50
OD_AXIS: 005

## 2023-08-16 ASSESSMENT — KERATOMETRY
OS_K2POWER_DIOPTERS: 44.50
OS_K1POWER_DIOPTERS: 42.75
OD_K2POWER_DIOPTERS: 45.00
OS_AXISANGLE_DEGREES: 005
OD_K1POWER_DIOPTERS: 43.00
OD_AXISANGLE_DEGREES: 004

## 2024-01-16 ENCOUNTER — HOSPITAL ENCOUNTER (EMERGENCY)
Facility: HOSPITAL | Age: 39
Discharge: HOME | End: 2024-01-16
Attending: EMERGENCY MEDICINE
Payer: COMMERCIAL

## 2024-01-16 VITALS
RESPIRATION RATE: 18 BRPM | TEMPERATURE: 97.1 F | DIASTOLIC BLOOD PRESSURE: 80 MMHG | OXYGEN SATURATION: 97 % | WEIGHT: 160.4 LBS | BODY MASS INDEX: 25.89 KG/M2 | HEART RATE: 123 BPM | SYSTOLIC BLOOD PRESSURE: 107 MMHG
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
## 2024-01-16 PROCEDURE — 90715 TDAP VACCINE 7 YRS/> IM: CPT | Performed by: EMERGENCY MEDICINE

## 2024-01-16 PROCEDURE — 99283 EMERGENCY DEPT VISIT LOW MDM: CPT

## 2024-01-16 PROCEDURE — 99284 EMERGENCY DEPT VISIT MOD MDM: CPT | Performed by: EMERGENCY MEDICINE

## 2024-01-16 PROCEDURE — 90471 IMMUNIZATION ADMIN: CPT

## 2024-01-16 RX ORDER — AMOXICILLIN AND CLAVULANATE POTASSIUM 875; 125 MG/1; MG/1
1 TABLET, FILM COATED ORAL EVERY 12 HOURS
Qty: 14 TABLET | Refills: 0 | Status: SHIPPED | OUTPATIENT
Start: 2024-01-16 | End: 2024-01-23

## 2024-01-16 RX ORDER — IBUPROFEN 600 MG/1
600 TABLET ORAL ONCE
Status: COMPLETED | OUTPATIENT
Start: 2024-01-16 | End: 2024-01-16

## 2024-01-16 RX ORDER — AMOXICILLIN AND CLAVULANATE POTASSIUM 875; 125 MG/1; MG/1
1 TABLET, FILM COATED ORAL ONCE
Status: COMPLETED | OUTPATIENT
Start: 2024-01-16 | End: 2024-01-16

## 2024-01-16 RX ADMIN — IBUPROFEN 600 MG: 600 TABLET ORAL at 15:34

## 2024-01-16 RX ADMIN — TETANUS TOXOID, REDUCED DIPHTHERIA TOXOID AND ACELLULAR PERTUSSIS VACCINE, ADSORBED 0.5 ML: 5; 2.5; 8; 8; 2.5 SUSPENSION INTRAMUSCULAR at 15:18

## 2024-01-16 RX ADMIN — AMOXICILLIN AND CLAVULANATE POTASSIUM 1 TABLET: 875; 125 TABLET, FILM COATED ORAL at 15:18

## 2024-01-16 NOTE — ED PROVIDER NOTES
History  Chief Complaint   Patient presents with    Cat Bite     Pt was attempting to get father's cat when it bit and scratched left hand. Unsure if cat is UTD on rabies. Unsure if tetanus UTD.        History provided by:  Medical records and patient  Cat Bite  Contact animal:  Cat  Location:  Hand  Hand injury location:  L hand  Time since incident:  30 minutes  Pain details:     Quality:  Aching and dull    Severity:  Moderate    Timing:  Constant    Progression:  Unchanged  Incident location:  Home  Provoked: provoked (This was her father's cat, her father recently passed away last weekend, she was cleaning out the apartment and attempted to grab the cat, the cat bit her in the left hand)    Notifications:  None  Animal's rabies vaccination status:  Unknown  Animal in possession: yes    Tetanus status:  Unknown (Review of records show that she had a booster in 2019)  Relieved by:  Nothing  Worsened by:  Nothing  Ineffective treatments:  None tried  Associated symptoms: no fever, no numbness, no rash and no swelling        Prior to Admission Medications   Prescriptions Last Dose Informant Patient Reported? Taking?   CYANOCOBALAMIN PO   Yes No   Sig: Take 1 tablet by mouth daily   Diclofenac Sodium (VOLTAREN) 1 %   Yes No   Sig: Apply 2-4 grams topically two to four times daily.  Apply to both knees.   Linzess 290 MCG CAPS   Yes No   Sig: TAKE 1 CAPSULE BY MOUTH 30 MINUTES BEFORE BREAKFAST   Prenatal Vit-Fe Fumarate-FA (WesTab Plus) 27-1 MG TABS   Yes No   Sig: Take 1 tablet by mouth every morning   Prenatal Vit-Iron Carbonyl-FA (prenatal multivitamin) TABS   Yes No   Sig: Take 1 tablet by mouth daily   Patient not taking: Reported on 4/5/2023   amitriptyline (ELAVIL) 10 mg tablet   Yes No   Sig: Take 10 mg by mouth daily at bedtime   cyclobenzaprine (FLEXERIL) 10 mg tablet   Yes No   Sig: Take 10 mg by mouth   Patient not taking: Reported on 4/5/2023   fluconazole (DIFLUCAN) 150 mg tablet   Yes No   Sig: take 1  tablet by mouth AS A ONE TIME DOSE   gabapentin (NEURONTIN) 600 MG tablet   Yes No   Sig: Take 600 mg by mouth Three times a day   lactulose (CHRONULAC) 10 g/15 mL solution   Yes No   Sig: take 30 milliliters by mouth once daily if needed   levothyroxine 25 mcg tablet   Yes No   Sig: take 1/2 tablet by mouth once daily 30 MINUTES BEFORE BREAKFAST   lidocaine (LIDODERM) 5 %   Yes No   Sig: Place 3 patches on the skin every 24 hours   metFORMIN (GLUCOPHAGE) 1000 MG tablet   Yes No   Sig: take 1 tablet by mouth twice a day WITH MRONING AND EVENING MEALS   norethindrone-ethinyl estradiol-iron (MICROGESTIN FE.) 1.5-30 MG-MCG tablet   Yes No   Sig: Take 1 tablet by mouth daily   nystatin (MYCOSTATIN) powder   Yes No   Sig: APPLY UNDER ABDOMINAL SKIN FOLD TWICE DAILY   phentermine (ADIPEX-P) 37.5 MG tablet   Yes No   Sig: Take 1 tablet by mouth daily before breakfast   phentermine 37.5 MG capsule   Yes No   Sig: Take 37.5 mg by mouth every morning   Patient not taking: Reported on 2023   polyethylene glycol (GLYCOLAX) 17 GM/SCOOP powder   Yes No   Sig: Take 17 g by mouth daily   senna-docusate sodium (SENOKOT S) 8.6-50 mg per tablet   Yes No   Sig: Take 1 tablet by mouth daily   spironolactone (ALDACTONE) 100 mg tablet   Yes No   Sig: Take 100 mg by mouth 2 (two) times a day   topiramate (TOPAMAX) 25 mg tablet   Yes No   Sig: Take 25 mg by mouth 2 (two) times a day      Facility-Administered Medications: None       Past Medical History:   Diagnosis Date    Arthritis     H/O LEEP        Past Surgical History:   Procedure Laterality Date    ANKLE SURGERY Right     CERVIX SURGERY       SECTION      CHOLECYSTECTOMY      LIPOMA RESECTION      NERVE BLOCK Bilateral 2023    Procedure: BLOCK MEDIAL BRANCH L3, L4, L5 #1;  Surgeon: Terrell Melgoza MD;  Location: [facility];  Service: Pain Management     OVARY SURGERY      TONSILLECTOMY         History reviewed. No pertinent family history.  I have reviewed and  agree with the history as documented.    E-Cigarette/Vaping    E-Cigarette Use Never User      E-Cigarette/Vaping Substances     Social History     Tobacco Use    Smoking status: Never    Smokeless tobacco: Never   Vaping Use    Vaping status: Never Used   Substance Use Topics    Alcohol use: Never    Drug use: Never       Review of Systems   Constitutional:  Negative for chills, fatigue and fever.   HENT:  Negative for ear discharge, ear pain, rhinorrhea and sore throat.    Eyes:  Negative for pain and visual disturbance.   Respiratory:  Negative for cough and shortness of breath.    Cardiovascular:  Negative for chest pain and palpitations.   Gastrointestinal:  Negative for abdominal pain, diarrhea, nausea and vomiting.   Endocrine: Negative for polydipsia, polyphagia and polyuria.   Genitourinary:  Negative for difficulty urinating, dysuria, flank pain and hematuria.   Musculoskeletal:  Negative for arthralgias and back pain.   Skin:  Positive for wound. Negative for color change and rash.   Allergic/Immunologic: Negative for immunocompromised state.   Neurological:  Negative for dizziness, seizures, syncope, weakness, numbness and headaches.   Psychiatric/Behavioral:  Negative for confusion and self-injury. The patient is not nervous/anxious.    All other systems reviewed and are negative.      Physical Exam  Physical Exam  Vitals and nursing note reviewed.   Constitutional:       General: She is not in acute distress.     Appearance: Normal appearance. She is not ill-appearing, toxic-appearing or diaphoretic.   HENT:      Head: Normocephalic and atraumatic.      Nose: Nose normal. No congestion or rhinorrhea.      Mouth/Throat:      Mouth: Mucous membranes are moist.      Pharynx: Oropharynx is clear. No oropharyngeal exudate or posterior oropharyngeal erythema.   Eyes:      General:         Right eye: No discharge.         Left eye: No discharge.   Cardiovascular:      Rate and Rhythm: Normal rate and regular  rhythm.      Pulses: Normal pulses.      Heart sounds: Normal heart sounds. No murmur heard.     No gallop.      Comments: +3 radial pulses bilateral  Pulmonary:      Effort: Pulmonary effort is normal. No respiratory distress.      Breath sounds: Normal breath sounds.   Musculoskeletal:         General: Tenderness present. No swelling, deformity or signs of injury. Normal range of motion.      Cervical back: Normal range of motion and neck supple.      Right lower leg: No edema.      Left lower leg: No edema.   Skin:     General: Skin is warm and dry.      Capillary Refill: Capillary refill takes less than 2 seconds.      Findings: No rash.      Comments: Patient has numerous subcentimeter lacerations/puncture wounds to the first webspace of the left hand, no cellulitis, no swelling   Neurological:      General: No focal deficit present.      Mental Status: She is alert and oriented to person, place, and time.      Cranial Nerves: No cranial nerve deficit.      Sensory: No sensory deficit.      Motor: No weakness.      Coordination: Coordination normal.      Gait: Gait normal.      Deep Tendon Reflexes: Reflexes normal.   Psychiatric:         Mood and Affect: Mood normal.         Behavior: Behavior normal.         Thought Content: Thought content normal.         Judgment: Judgment normal.         Vital Signs  ED Triage Vitals [01/16/24 1503]   Temperature Pulse Respirations Blood Pressure SpO2   (!) 97.1 °F (36.2 °C) (!) 123 18 107/80 97 %      Temp Source Heart Rate Source Patient Position - Orthostatic VS BP Location FiO2 (%)   Temporal Monitor Sitting Right arm --      Pain Score       --           Vitals:    01/16/24 1503   BP: 107/80   Pulse: (!) 123   Patient Position - Orthostatic VS: Sitting         Visual Acuity      ED Medications  Medications   amoxicillin-clavulanate (AUGMENTIN) 875-125 mg per tablet 1 tablet (has no administration in time range)   tetanus-diphtheria-acellular pertussis (BOOSTRIX) IM  injection 0.5 mL (has no administration in time range)       Diagnostic Studies  Results Reviewed       None                   No orders to display              Procedures  Procedures         ED Course                               SBIRT 22yo+      Flowsheet Row Most Recent Value   Initial Alcohol Screen: US AUDIT-C     1. How often do you have a drink containing alcohol? 0 Filed at: 01/16/2024 1505   2. How many drinks containing alcohol do you have on a typical day you are drinking?  0 Filed at: 01/16/2024 1505   3b. FEMALE Any Age, or MALE 65+: How often do you have 4 or more drinks on one occassion? 0 Filed at: 01/16/2024 1505   Audit-C Score 0 Filed at: 01/16/2024 1505   ROBERT: How many times in the past year have you...    Used an illegal drug or used a prescription medication for non-medical reasons? Never Filed at: 01/16/2024 1505                      Medical Decision Making  1457: Patient appears well, vital signs reviewed.  Patient sustained cat bites to the first webspace of the left hand.  There is no evidence of cellulitis at this time.  Due to the multiple puncture wounds however plan to start on antibiotics empirically.  Last tetanus shot was in 2019, the patient is requesting a rebooster.  The cat is under observation.  His cat has been a healthy cat, well-known to the family, unknown vaccination record for the cat however no need for rabies vaccination if can be observed.  Wound care instructed.             Disposition  Final diagnoses:   Cat bite, initial encounter     Time reflects when diagnosis was documented in both MDM as applicable and the Disposition within this note       Time User Action Codes Description Comment    1/16/2024  3:10 PM Rosas Stone Add [W55.01XA] Cat bite, initial encounter           ED Disposition       ED Disposition   Discharge    Condition   Stable    Date/Time   Tue Jan 16, 2024 1512    Comment   Martha Toussaint discharge to home/self care.                    Follow-up Information       Follow up With Specialties Details Why Contact Info    QUINTEN Venegas Nurse Practitioner Schedule an appointment as soon as possible for a visit in 3 days For wound re-check [address] [facility] [id number]  [phone]              Patient's Medications   Discharge Prescriptions    AMOXICILLIN-CLAVULANATE (AUGMENTIN) 875-125 MG PER TABLET    Take 1 tablet by mouth every 12 (twelve) hours for 7 days       Start Date: 1/16/2024 End Date: 1/23/2024       Order Dose: 1 tablet       Quantity: 14 tablet    Refills: 0       No discharge procedures on file.    PDMP Review       None            ED Provider  Electronically Signed by             Rosas Stone MD  01/16/24 8716

## 2024-01-16 NOTE — Clinical Note
Martha Toussaint was seen and treated in our emergency department on 1/16/2024.                Diagnosis:     Martha  may return to work on return date.    She may return on this date: 01/17/2024         If you have any questions or concerns, please don't hesitate to call.      Rosas Stone MD    ______________________________           _______________          _______________  Hospital Representative                              Date                                Time

## 2024-01-16 NOTE — ED NOTES
"Pt questioning treatment and is requesting IV antibiotics per [employer]. Educated and explained to pt however remains unsatisfied. Requesting doctor to come back to bedside to further explain why she is not receiving \"intervenous\". Pt stating, \"are you guys just too busy to treat people appropriately since I am in the [place] and not getting intervenous?\"  Provider at bedside to address patient's concerns.      Rylee M Devlin, RN  01/16/24 1179    "

## 2024-08-19 ENCOUNTER — DOCTOR'S OFFICE (OUTPATIENT)
Dept: URBAN - NONMETROPOLITAN AREA CLINIC 1 | Facility: CLINIC | Age: 39
Setting detail: OPHTHALMOLOGY
End: 2024-08-19
Payer: COMMERCIAL

## 2024-08-19 DIAGNOSIS — H40.013: ICD-10-CM

## 2024-08-19 DIAGNOSIS — E11.3293: ICD-10-CM

## 2024-08-19 DIAGNOSIS — H25.013: ICD-10-CM

## 2024-08-19 PROCEDURE — 92134 CPTRZ OPH DX IMG PST SGM RTA: CPT | Performed by: OPTOMETRIST

## 2024-08-19 PROCEDURE — 92014 COMPRE OPH EXAM EST PT 1/>: CPT | Performed by: OPTOMETRIST

## 2024-08-19 ASSESSMENT — CONFRONTATIONAL VISUAL FIELD TEST (CVF)
OS_FINDINGS: FULL
OD_FINDINGS: FULL

## 2024-09-24 LAB
ALBUMIN SERPL-MCNC: 4.1 G/DL (ref 3.6–5.1)
ALBUMIN/GLOB SERPL: 1.4 (CALC) (ref 1–2.5)
ALP SERPL-CCNC: 31 U/L (ref 31–125)
ALT SERPL-CCNC: 19 U/L (ref 6–29)
AST SERPL-CCNC: 21 U/L (ref 10–30)
BILIRUB SERPL-MCNC: 0.3 MG/DL (ref 0.2–1.2)
BUN SERPL-MCNC: 13 MG/DL (ref 7–25)
BUN/CREAT SERPL: NORMAL (CALC) (ref 6–22)
CALCIUM SERPL-MCNC: 9.3 MG/DL (ref 8.6–10.2)
CHLORIDE SERPL-SCNC: 104 MMOL/L (ref 98–110)
CO2 SERPL-SCNC: 23 MMOL/L (ref 20–32)
CREAT SERPL-MCNC: 0.9 MG/DL (ref 0.5–0.97)
GFR/BSA.PRED SERPLBLD CYS-BASED-ARV: 84 ML/MIN/1.73M2
GLOBULIN SER CALC-MCNC: 3 G/DL (CALC) (ref 1.9–3.7)
GLUCOSE SERPL-MCNC: 80 MG/DL (ref 65–99)
POTASSIUM SERPL-SCNC: 4.1 MMOL/L (ref 3.5–5.3)
PROT SERPL-MCNC: 7.1 G/DL (ref 6.1–8.1)
SODIUM SERPL-SCNC: 138 MMOL/L (ref 135–146)

## 2025-08-20 ENCOUNTER — OPTICAL OFFICE (OUTPATIENT)
Dept: URBAN - NONMETROPOLITAN AREA CLINIC 4 | Facility: CLINIC | Age: 40
Setting detail: OPHTHALMOLOGY
End: 2025-08-20
Payer: COMMERCIAL

## 2025-08-20 ENCOUNTER — DOCTOR'S OFFICE (OUTPATIENT)
Dept: URBAN - NONMETROPOLITAN AREA CLINIC 1 | Facility: CLINIC | Age: 40
Setting detail: OPHTHALMOLOGY
End: 2025-08-20
Payer: COMMERCIAL

## 2025-08-20 DIAGNOSIS — H40.011: ICD-10-CM

## 2025-08-20 DIAGNOSIS — H04.123: ICD-10-CM

## 2025-08-20 DIAGNOSIS — H25.013: ICD-10-CM

## 2025-08-20 DIAGNOSIS — H40.013: ICD-10-CM

## 2025-08-20 DIAGNOSIS — E11.3293: ICD-10-CM

## 2025-08-20 DIAGNOSIS — H52.223: ICD-10-CM

## 2025-08-20 PROCEDURE — 92083 EXTENDED VISUAL FIELD XM: CPT | Performed by: OPTOMETRIST

## 2025-08-20 PROCEDURE — V2103 SPHEROCYLINDR 4.00D/12-2.00D: HCPCS | Performed by: OPTOMETRIST

## 2025-08-20 PROCEDURE — V2020 VISION SVCS FRAMES PURCHASES: HCPCS | Performed by: OPTOMETRIST

## 2025-08-20 PROCEDURE — V2103 SPHEROCYLINDR 4.00D/12-2.00D: HCPCS | Mod: LT | Performed by: OPTOMETRIST

## 2025-08-20 PROCEDURE — 92014 COMPRE OPH EXAM EST PT 1/>: CPT | Performed by: OPTOMETRIST

## 2025-08-20 ASSESSMENT — REFRACTION_CURRENTRX
OS_OVR_VA: 20/
OS_AXIS: 004
OS_CYLINDER: -1.50
OD_OVR_VA: 20/
OS_SPHERE: -2.50
OD_VPRISM_DIRECTION: SV
OD_AXIS: 010
OS_VPRISM_DIRECTION: SV
OD_CYLINDER: -1.50
OD_SPHERE: -2.50

## 2025-08-20 ASSESSMENT — REFRACTION_MANIFEST
OS_SPHERE: -2.50
OS_VA2: 20/25
OD_VA1: 20/20
OD_AXIS: 010
OD_SPHERE: -2.50
OD_CYLINDER: -1.75
OD_VA2: 20/25
OS_VA1: 20/20
OS_AXIS: 180
OS_CYLINDER: -2.00

## 2025-08-20 ASSESSMENT — PACHYMETRY
OD_CT_UM: 591
OS_CT_UM: 597
OS_CT_CORRECTION: -4
OD_CT_CORRECTION: -4

## 2025-08-20 ASSESSMENT — KERATOMETRY
OS_AXISANGLE_DEGREES: 005
OS_K1POWER_DIOPTERS: 42.75
OS_K2POWER_DIOPTERS: 44.50
OD_K2POWER_DIOPTERS: 45.00
OD_AXISANGLE_DEGREES: 004
OD_K1POWER_DIOPTERS: 43.00

## 2025-08-20 ASSESSMENT — REFRACTION_AUTOREFRACTION
OD_SPHERE: -2.75
OD_AXIS: 005
OS_CYLINDER: -1.00
OS_SPHERE: -3.00
OS_AXIS: 176
OD_CYLINDER: -1.25

## 2025-08-20 ASSESSMENT — SUPERFICIAL PUNCTATE KERATITIS (SPK)
OD_SPK: T
OS_SPK: 1+

## 2025-08-20 ASSESSMENT — VASCULARIZATION
OD_VASCULARIZATION: PANNUS
OS_VASCULARIZATION: PANNUS

## 2025-08-20 ASSESSMENT — VISUAL ACUITY
OD_BCVA: 20/20
OS_BCVA: 20/20

## 2025-08-20 ASSESSMENT — CONFRONTATIONAL VISUAL FIELD TEST (CVF)
OS_FINDINGS: FULL
OD_FINDINGS: FULL

## 2025-08-20 ASSESSMENT — TONOMETRY
OS_IOP_MMHG: 15
OD_IOP_MMHG: 15

## (undated) DEVICE — NEEDLE 18 G X 1 1/2 SAFETY

## (undated) DEVICE — DRAPE TOWEL: Brand: CONVERTORS

## (undated) DEVICE — NEEDLE 25G X 1 1/2

## (undated) DEVICE — SYRINGE 10ML LL

## (undated) DEVICE — SKIN MARKER DUAL TIP WITH RULER CAP, FLEXIBLE RULER AND LABELS: Brand: DEVON

## (undated) DEVICE — GLOVE SRG LF STRL BGL SKNSNS 7.5 PF

## (undated) DEVICE — FLEXIBLE ADHESIVE BANDAGE,X-LARGE: Brand: CURITY

## (undated) DEVICE — GAUZE SPONGES,USP TYPE VII GAUZE, 12 PLY: Brand: CURITY

## (undated) DEVICE — PLASTIC ADHESIVE BANDAGE: Brand: CURITY

## (undated) DEVICE — NEEDLE SPINAL 22G X 3.5IN  QUINCKE

## (undated) DEVICE — CHLORAPREP HI-LITE 10.5ML ORANGE

## (undated) DEVICE — DRAPE SHEET THREE QUARTER